# Patient Record
Sex: MALE | Race: WHITE | Employment: OTHER | URBAN - METROPOLITAN AREA
[De-identification: names, ages, dates, MRNs, and addresses within clinical notes are randomized per-mention and may not be internally consistent; named-entity substitution may affect disease eponyms.]

---

## 2017-01-25 RX ORDER — PANTOPRAZOLE SODIUM 40 MG/1
TABLET, DELAYED RELEASE ORAL
Qty: 180 TAB | Refills: 0 | Status: SHIPPED | OUTPATIENT
Start: 2017-01-25 | End: 2017-01-31 | Stop reason: CLARIF

## 2017-01-31 RX ORDER — PANTOPRAZOLE SODIUM 40 MG/1
40 TABLET, DELAYED RELEASE ORAL 2 TIMES DAILY
Qty: 180 TAB | Refills: 1 | Status: SHIPPED | OUTPATIENT
Start: 2017-01-31 | End: 2018-01-01 | Stop reason: ALTCHOICE

## 2017-02-22 RX ORDER — LANCETS
EACH MISCELLANEOUS
Qty: 300 EACH | Refills: 2 | Status: CANCELLED | OUTPATIENT
Start: 2017-02-22

## 2017-02-22 RX ORDER — LANCETS
EACH MISCELLANEOUS
Qty: 300 EACH | Refills: 4 | Status: SHIPPED | OUTPATIENT
Start: 2017-02-22

## 2017-02-22 NOTE — TELEPHONE ENCOUNTER
Orders Placed This Encounter    Lancets misc     Sig: by Does Not Apply route Daily (before breakfast). DX: E11.65     Dispense:  300 Each     Refill:  4    glucose blood VI test strips (FREESTYLE LITE STRIPS) strip     Sig: by Does Not Apply route Daily (before breakfast). DX: E11.65     Dispense:  300 Strip     Refill:  4     The above medication refills were approved via verbal order by Dr. Jacy Olivares III.

## 2017-03-03 RX ORDER — FUROSEMIDE 80 MG/1
TABLET ORAL
Qty: 90 TAB | Refills: 0 | Status: SHIPPED | OUTPATIENT
Start: 2017-03-03 | End: 2017-06-06 | Stop reason: SDUPTHER

## 2017-03-03 RX ORDER — ATORVASTATIN CALCIUM 20 MG/1
TABLET, FILM COATED ORAL
Qty: 90 TAB | Refills: 0 | Status: SHIPPED | OUTPATIENT
Start: 2017-03-03 | End: 2017-06-06 | Stop reason: SDUPTHER

## 2017-03-13 ENCOUNTER — OFFICE VISIT (OUTPATIENT)
Dept: INTERNAL MEDICINE CLINIC | Age: 82
End: 2017-03-13

## 2017-03-13 VITALS
SYSTOLIC BLOOD PRESSURE: 130 MMHG | WEIGHT: 245 LBS | HEIGHT: 73 IN | DIASTOLIC BLOOD PRESSURE: 72 MMHG | RESPIRATION RATE: 20 BRPM | HEART RATE: 88 BPM | BODY MASS INDEX: 32.47 KG/M2 | OXYGEN SATURATION: 95 % | TEMPERATURE: 99.1 F

## 2017-03-13 DIAGNOSIS — L98.491 SKIN ULCER, LIMITED TO BREAKDOWN OF SKIN (HCC): Primary | ICD-10-CM

## 2017-03-13 RX ORDER — CEPHALEXIN 250 MG/1
250 CAPSULE ORAL 3 TIMES DAILY
Qty: 30 CAP | Refills: 0 | Status: SHIPPED | OUTPATIENT
Start: 2017-03-13 | End: 2017-07-11 | Stop reason: ALTCHOICE

## 2017-03-13 NOTE — MR AVS SNAPSHOT
Visit Information Date & Time Provider Department Dept. Phone Encounter #  
 3/13/2017  3:30 PM Johan Canchola MD [facility] Internal Medicine [phone] [id number] Your Appointments 3/22/2017  2:00 PM  
ROUTINE CARE with Johan Canchola MD  
[facility] Internal Medicine [address]) Appt Note: 6mo f/u; sw  
 [address] [facility] [address] [facility] [address]  
[address] [address] [place] Upcoming Health Maintenance Date Due DTaP/Tdap/Td series (1 - Tdap) 4/20/1952 MEDICARE YEARLY EXAM 9/6/2013 FOOT EXAM Q1 4/6/2016 INFLUENZA AGE 9 TO ADULT 8/1/2016 HEMOGLOBIN A1C Q6M 3/29/2017 LIPID PANEL Q1 9/29/2017 MICROALBUMIN Q1 11/2/2017 EYE EXAM RETINAL OR DILATED Q1 11/10/2017 Pneumococcal 65+ Low/Medium Risk (2 of 2 - PPSV23) 4/25/2018 GLAUCOMA SCREENING Q2Y 11/10/2018 Allergies as of 3/13/2017  Review Complete On: 3/13/2017 By: Christopher Escamilla LPN Severity Noted Reaction Type Reactions Erythromycin High 09/05/2012    Swelling Goiter swells Lisinopril  05/07/2014   Systemic Other (comments) [place] Losartan  10/20/2014    Shortness of Breath Current Immunizations  Reviewed on 10/20/2014 Name Date Influenza Vaccine 12/1/2013 Influenza Vaccine Split 12/19/2012 Pneumococcal Vaccine (Unspecified Type) 4/25/2013 Not reviewed this visit Vitals BP Pulse Temp Resp Height(growth percentile) Weight(growth percentile) 130/72 (BP 1 Location: Right arm, BP Patient Position: Sitting) 88 99.1 °F (37.3 °C) (Oral) 20 6' 1\" (1.854 m) 245 lb (111.1 kg) SpO2 BMI Smoking Status 95% 32.32 kg/m2 Former Smoker BMI and BSA Data Body Mass Index Body Surface Area  
 32.32 kg/m 2 2.39 m 2 Preferred Pharmacy Pharmacy Name Phone  [address] [facility] [phone] Your Updated Medication List  
  
   
This list is accurate as of: 3/13/17  3:59 PM.  Always use your most recent med list.  
  
  
  
  
 albuterol 90 mcg/actuation inhaler Commonly known as:  PROVENTIL HFA, VENTOLIN HFA, PROAIR HFA Take 1 Puff by inhalation every four (4) hours as needed for Wheezing or Shortness of Breath. aspirin delayed-release 81 mg tablet Take 81 mg by mouth daily. atorvastatin 20 mg tablet Commonly known as:  LIPITOR  
TAKE 1 TABLET BY MOUTH EVERY DAY Blood-Glucose Meter monitoring kit Commonly known as:  FREESTYLE LITE METER As directed 250.00 * carvedilol 6.25 mg tablet Commonly known as:  Travis Peat Take 1 Tab by mouth two (2) times daily (with meals). * carvedilol 6.25 mg tablet Commonly known as:  COREG  
TAKE 2 TABLETS BY MOUTH TWICE A DAY CENTRUM SILVER Tab tablet Generic drug:  multivitamins-minerals-lutein Take 1 Tab by mouth daily. cephALEXin 250 mg capsule Commonly known as:  Rahul Began Take 1 Cap by mouth three (3) times daily. docusate sodium 50 mg capsule Commonly known as:  Mat [place] Take 50 mg by mouth as needed. furosemide 80 mg tablet Commonly known as:  LASIX TAKE 1 TABLET BY MOUTH EVERY DAY  
  
 glucose blood VI test strips strip Commonly known as:  FREESTYLE LITE STRIPS  
by Does Not Apply route Daily (before breakfast). DX: E11.65  
  
 ICAPS AREDS PO Take 1 Cap by mouth two (2) times a day. Iron 325 mg (65 mg iron) tablet Generic drug:  ferrous sulfate Take 325 mg by mouth daily. Lancets Misc  
by Does Not Apply route Daily (before breakfast). DX: E11.65 OXYGEN-AIR DELIVERY SYSTEMS  
3 L by Does Not Apply route. pantoprazole 40 mg tablet Commonly known as:  PROTONIX Take 1 Tab by mouth two (2) times a day. senna-docusate 8.6-50 mg per tablet Commonly known as:  Rosetta Guzman Take 3 Tabs by mouth as needed. SITagliptin 50 mg tablet Commonly known as:  Ty Muscat Take 1 Tab by mouth daily. TYLENOL 325 mg tablet Generic drug:  acetaminophen Take 650 mg by mouth as needed. umeclidinium-vilanterol 62.5-25 mcg/actuation inhaler Commonly known as:  Tanya Pu Take 1 Puff by inhalation daily. ZyrTEC 10 mg tablet Generic drug:  cetirizine Take 10 mg by mouth nightly. * Notice: This list has 2 medication(s) that are the same as other medications prescribed for you. Read the directions carefully, and ask your doctor or other care provider to review them with you. Prescriptions Sent to Pharmacy Refills  
 cephALEXin (KEFLEX) 250 mg capsule 0 Sig: Take 1 Cap by mouth three (3) times daily. Class: Normal  
 Pharmacy: [address], [address]  #: [phone] Route: Oral  
  
Patient Instructions Use Dial soap and water to wash the ulcer daily and then small amount of neosporin daily. Introducing [facility]! Dear Che Blackmon: 
Thank you for requesting a IntelligentMDx account. Our records indicate that you already have an active IntelligentMDx account. You can access your account anytime at [email or web address] Did you know that you can access your hospital and ER discharge instructions at any time in IntelligentMDx? You can also review all of your test results from your hospital stay or ER visit. Additional Information If you have questions, please visit the Frequently Asked Questions section of the IntelligentMDx website at [email or web address] Remember, IntelligentMDx is NOT to be used for urgent needs. For medical emergencies, dial 911. Now available from your iPhone and Android! Please provide this summary of care documentation to your next provider. Your primary care clinician is listed as Sara [phone]  If you have any questions after today's visit, please call [phone].

## 2017-03-13 NOTE — PROGRESS NOTES
HISTORY OF PRESENT ILLNESS  Christoph Padilla is a 80 y.o. male. HPI  Presents for a lesion on the right nipple for the last week or so. Some bloody drainage. No fever or chills. No injury. No other skin issues. Also feeling well otherwise. Past Medical History:   Diagnosis Date    Arrhythmia     pacemaker    CAD (coronary artery disease)     Diabetes ([address]     Heart disease     Hypertension     TRACI on CPAP     AHI: 24 per hour    Unspecified sleep apnea     CPAP    Vertigo      Past Surgical History:   Procedure Laterality Date    CARDIAC SURG PROCEDURE UNLIST  2007    valve replacement, one bypass, AAA repair    CARDIAC SURG PROCEDURE UNLIST  2014    HX HEENT      uvulectomy    HX HEENT      teeth    HX HEENT      cateract    HX ORTHOPAEDIC      rotator cuff - rt    HX PACEMAKER       Current Outpatient Prescriptions on File Prior to Visit   Medication Sig Dispense Refill    furosemide (LASIX) 80 mg tablet TAKE 1 TABLET BY MOUTH EVERY DAY 90 Tab 0    atorvastatin (LIPITOR) 20 mg tablet TAKE 1 TABLET BY MOUTH EVERY DAY 90 Tab 0    glucose blood VI test strips (FREESTYLE LITE STRIPS) strip by Does Not Apply route Daily (before breakfast). DX: E11.65 300 Strip 4    pantoprazole (PROTONIX) 40 mg tablet Take 1 Tab by mouth two (2) times a day. 180 Tab 1    carvedilol (COREG) 6.25 mg tablet TAKE 2 TABLETS BY MOUTH TWICE A  Tab 1    carvedilol (COREG) 6.25 mg tablet Take 1 Tab by mouth two (2) times daily (with meals). 180 Tab 1    umeclidinium-vilanterol (ANORO ELLIPTA) 62.5-25 mcg/actuation inhaler Take 1 Puff by inhalation daily. 1 Inhaler 6    OXYGEN-AIR DELIVERY SYSTEMS 3 L by Does Not Apply route.  sitaGLIPtin (JANUVIA) 50 mg tablet Take 1 Tab by mouth daily. 90 Tab 1    albuterol (PROVENTIL HFA, VENTOLIN HFA, PROAIR HFA) 90 mcg/actuation inhaler Take 1 Puff by inhalation every four (4) hours as needed for Wheezing or Shortness of Breath.  1 Inhaler 1    ferrous sulfate (IRON) 325 mg (65 mg iron) tablet Take 325 mg by mouth daily.  docusate sodium (COLACE) 50 mg capsule Take 50 mg by mouth as needed.  senna-docusate (PERICOLACE) 8.6-50 mg per tablet Take 3 Tabs by mouth as needed.  cetirizine (ZYRTEC) 10 mg tablet Take 10 mg by mouth nightly.  acetaminophen (TYLENOL) 325 mg tablet Take 650 mg by mouth as needed.  aspirin delayed-release 81 mg tablet Take 81 mg by mouth daily.  VIT A/VIT C/VIT E/ZINC/COPPER (ICAPS AREDS PO) Take 1 Cap by mouth two (2) times a day.  Blood-Glucose Meter (FREESTYLE LITE METER) monitoring kit As directed  250.00 1 Kit 0    multivitamins-minerals-lutein (CENTRUM SILVER) Tab Take 1 Tab by mouth daily.  Lancets misc by Does Not Apply route Daily (before breakfast). DX: E11.65 300 Each 4     No current facility-administered medications on file prior to visit. ROS  Per HPI  Physical Exam   Physical Examination: General appearance - alert, well appearing, and in no distress  Chest - clear to auscultation, no wheezes, rales or rhonchi, symmetric air entry  Heart - normal rate and regular rhythm  Right nipple with small ulcer below the nipple with a scabbed area. Some mild surrounding erythema. Unable to get the scab to come off. ASSESSMENT and PLAN  Infected skin ulcer on the chest - May all be a seb cyst that opened and drained but also consider infected skin cancer or paget's. Will treat topically and with oral keflex and see him next week for followup and decide about derm MD evaluation if needed. Patient Instructions   Use Dial soap and water to wash the ulcer daily and then small amount of neosporin daily. Orders Placed This Encounter    cephALEXin (KEFLEX) 250 mg capsule     Sig: Take 1 Cap by mouth three (3) times daily. Dispense:  30 Cap     Refill:  0    SITagliptin (JANUVIA) 100 mg tablet     Sig: Take 1 Tab by mouth daily. Take 1/2 tablet (50 mg) daily.      Dispense:  14 Tab     Refill:  0 Order Specific Question:   Expiration Date     Answer:   6/30/2019     Order Specific Question:   Lot#     Answer:   [id number]     Order Specific Question:        Answer:   [employer]     Order Specific Question:   NDC#     Answer:   No NDC on box. Advised him to call back or return to office if symptoms worsen/change/persist.  Discussed expected course/resolution/complications of diagnosis in detail with patient. Medication risks/benefits/costs/interactions/alternatives discussed with patient. He was given an after visit summary which includes diagnoses, current medications, & vitals. He expressed understanding with the diagnosis and plan.

## 2017-03-22 ENCOUNTER — OFFICE VISIT (OUTPATIENT)
Dept: INTERNAL MEDICINE CLINIC | Age: 82
End: 2017-03-22

## 2017-03-22 VITALS
SYSTOLIC BLOOD PRESSURE: 110 MMHG | OXYGEN SATURATION: 95 % | DIASTOLIC BLOOD PRESSURE: 70 MMHG | HEART RATE: 69 BPM | TEMPERATURE: 97.7 F | BODY MASS INDEX: 33.08 KG/M2 | HEIGHT: 73 IN | WEIGHT: 249.6 LBS | RESPIRATION RATE: 19 BRPM

## 2017-03-22 DIAGNOSIS — N17.9 AKI (ACUTE KIDNEY INJURY) (HCC): ICD-10-CM

## 2017-03-22 DIAGNOSIS — G47.33 OBSTRUCTIVE SLEEP APNEA SYNDROME: ICD-10-CM

## 2017-03-22 DIAGNOSIS — D69.6 THROMBOCYTOPENIA, UNSPECIFIED (HCC): ICD-10-CM

## 2017-03-22 DIAGNOSIS — E11.65 POORLY CONTROLLED TYPE 2 DIABETES MELLITUS (HCC): Primary | ICD-10-CM

## 2017-03-22 NOTE — PROGRESS NOTES
HPI:  Jw Lawrence is a 80y.o. year old male who is here for a routine visit:    Her for followup as well as the right breast lesion. Has been doing better with the skin lesion. No drainage. No fever or chills. No low sugars and most are in the 180 or less range. No chest pain. Some BRITTON. Some sore throat for 1 days as well. No change in bowels or bladder. Past Medical History:   Diagnosis Date    Arrhythmia     pacemaker    CAD (coronary artery disease)     Diabetes ([address]     Heart disease     Hypertension     JW on CPAP     AHI: 24 per hour    Unspecified sleep apnea     CPAP    Vertigo        Past Surgical History:   Procedure Laterality Date    CARDIAC SURG PROCEDURE UNLIST  2007    valve replacement, one bypass, AAA repair    CARDIAC SURG PROCEDURE UNLIST  2014    HX HEENT      uvulectomy    HX HEENT      teeth    HX HEENT      cateract    HX ORTHOPAEDIC      rotator cuff - rt    HX PACEMAKER         Prior to Admission medications    Medication Sig Start Date End Date Taking? Authorizing Provider   cephALEXin (KEFLEX) 250 mg capsule Take 1 Cap by mouth three (3) times daily. 3/13/17  Yes Srinivasa Betancourt III, MD   SITagliptin (JANUVIA) 100 mg tablet Take 1 Tab by mouth daily. Take 1/2 tablet (50 mg) daily. 3/13/17  Yes Srinivasa Betancourt III, MD   furosemide (LASIX) 80 mg tablet TAKE 1 TABLET BY MOUTH EVERY DAY 3/3/17  Yes Srinivasa Beatncourt III, MD   atorvastatin (LIPITOR) 20 mg tablet TAKE 1 TABLET BY MOUTH EVERY DAY 3/3/17  Yes Celestina Toscano MD   Lancets misc by Does Not Apply route Daily (before breakfast). DX: E11.65 2/22/17  Yes Srinivasa Betancourt III, MD   glucose blood VI test strips (FREESTYLE LITE STRIPS) strip by Does Not Apply route Daily (before breakfast). DX: E11.65 2/22/17  Yes Srinivasa Betancourt III, MD   pantoprazole (PROTONIX) 40 mg tablet Take 1 Tab by mouth two (2) times a day.  1/31/17  Yes Srinivasa Betancourt III, MD   carvedilol (COREG) 6.25 mg tablet TAKE 2 TABLETS BY MOUTH TWICE A DAY 12/13/16  Yes Trisha Lindsey III, MD   carvedilol (COREG) 6.25 mg tablet Take 1 Tab by mouth two (2) times daily (with meals). 10/6/16  Yes Jane Villalobos MD   umeclidinium-vilanterol [facility] ELLIPTA) 62.5-25 mcg/actuation inhaler Take 1 Puff by inhalation daily. 9/30/16  Yes Jane Villalobos MD   OXYGEN-AIR DELIVERY SYSTEMS 3 L by Does Not Apply route. Yes Historical Provider   sitaGLIPtin (JANUVIA) 50 mg tablet Take 1 Tab by mouth daily. 9/22/16  Yes Trisha Lindsey III, MD   albuterol (PROVENTIL HFA, VENTOLIN HFA, PROAIR HFA) 90 mcg/actuation inhaler Take 1 Puff by inhalation every four (4) hours as needed for Wheezing or Shortness of Breath. 2/27/15  Yes Deana Delatorre MD   ferrous sulfate (IRON) 325 mg (65 mg iron) tablet Take 325 mg by mouth daily. 8/24/14  Yes Historical Provider   docusate sodium (COLACE) 50 mg capsule Take 50 mg by mouth as needed. Yes Historical Provider   senna-docusate (PERICOLACE) 8.6-50 mg per tablet Take 3 Tabs by mouth as needed. Yes Historical Provider   cetirizine (ZYRTEC) 10 mg tablet Take 10 mg by mouth nightly. Yes Historical Provider   acetaminophen (TYLENOL) 325 mg tablet Take 650 mg by mouth as needed. Yes Historical Provider   aspirin delayed-release 81 mg tablet Take 81 mg by mouth daily. Yes Historical Provider   VIT A/VIT C/VIT E/ZINC/COPPER (ICAPS AREDS PO) Take 1 Cap by mouth two (2) times a day. Yes Historical Provider   Blood-Glucose Meter (FREESTYLE LITE METER) monitoring kit As directed  250.00 2/25/13  Yes Jane Villalobos MD   multivitamins-minerals-lutein (CENTRUM SILVER) Tab Take 1 Tab by mouth daily. Yes Historical Provider       Social History     Social History    Marital status:      Spouse name: N/A    Number of children: N/A    Years of education: N/A     Occupational History    Not on file.      Social History Main Topics    Smoking status: Former Smoker     Packs/day: 1.50     Years: 17.00     Quit date: 1/1/1969    Smokeless tobacco: Never Used    Alcohol use No    Drug use: No    Sexual activity: Not Currently     Other Topics Concern    Not on file     Social History Narrative          ROS  Per HPI    Visit Vitals    /70 (BP 1 Location: Right arm, BP Patient Position: Sitting)    Pulse 69    Temp 97.7 °F (36.5 °C) (Oral)    Resp 19    Ht 6' 1\" (1.854 m)    Wt 249 lb 9.6 oz (113.2 kg)    SpO2 95%    BMI 32.93 kg/m2         Physical Exam   Physical Examination: General appearance - alert, well appearing, and in no distress  Mouth - mucous membranes moist, pharynx normal without lesions  Neck - supple, no significant adenopathy  Lymphatics - no palpable lymphadenopathy, no hepatosplenomegaly  Chest - clear to auscultation, no wheezes, rales or rhonchi, symmetric air entry  Heart - normal rate and regular rhythm  Abdomen - soft, nontender, nondistended, no masses or organomegaly  Extremities - peripheral pulses normal, no pedal edema, no clubbing or cyanosis  Right nipple with small area of scabbed area with no surrounding erythema. Assessment/Plan:  Bharath Don was seen today for follow-up. Diagnoses and all orders for this visit:    Poorly controlled type 2 diabetes mellitus ([address] - check labs and adjust meds. -     CBC WITH AUTOMATED DIFF  -     METABOLIC PANEL, COMPREHENSIVE  -     LIPID PANEL  -     TSH RFX ON ABNORMAL TO FREE T4  -     UA/M W/RFLX CULTURE, ROUTINE  -     HEMOGLOBIN A1C WITH EAG    Thrombocytopenia, unspecified ([address] - check labs. MAYKEL (acute kidney injury) ([facility] - stable    Obstructive sleep apnea syndrome - stable    Skin infection - stable    Follow-up Disposition:  Return for Wellness Visit. Advised him to call back or return to office if symptoms worsen/change/persist.  Discussed expected course/resolution/complications of diagnosis in detail with patient. Medication risks/benefits/costs/interactions/alternatives discussed with patient.   He was given an after visit summary which includes diagnoses, current medications, & vitals. He expressed understanding with the diagnosis and plan.

## 2017-03-22 NOTE — MR AVS SNAPSHOT
Visit Information Date & Time Provider Department Dept. Phone Encounter #  
 3/22/2017  2:00 PM Haseeb Mueller MD [facility] Internal Medicine [phone] [id number] Follow-up Instructions Return for Wellness Visit. Upcoming Health Maintenance Date Due DTaP/Tdap/Td series (1 - Tdap) 4/20/1952 MEDICARE YEARLY EXAM 9/6/2013 FOOT EXAM Q1 4/6/2016 INFLUENZA AGE 9 TO ADULT 8/1/2016 HEMOGLOBIN A1C Q6M 3/29/2017 LIPID PANEL Q1 9/29/2017 MICROALBUMIN Q1 11/2/2017 EYE EXAM RETINAL OR DILATED Q1 11/10/2017 Pneumococcal 65+ Low/Medium Risk (2 of 2 - PPSV23) 4/25/2018 GLAUCOMA SCREENING Q2Y 11/10/2018 Allergies as of 3/22/2017  Review Complete On: 3/22/2017 By: Jorge Luis Rubio Severity Noted Reaction Type Reactions Erythromycin High 09/05/2012    Swelling Goiter swells Lisinopril  05/07/2014   Systemic Other (comments) [place] Losartan  10/20/2014    Shortness of Breath Current Immunizations  Reviewed on 10/20/2014 Name Date Influenza Vaccine 12/1/2013 Influenza Vaccine Split 12/19/2012 Pneumococcal Vaccine (Unspecified Type) 4/25/2013 Not reviewed this visit You Were Diagnosed With   
  
 Codes Comments Poorly controlled type 2 diabetes mellitus ([facility]    -  Primary ICD-10-CM: E11.65 ICD-9-CM: 250.00 Thrombocytopenia, unspecified ([facility]     ICD-10-CM: D69.6 ICD-9-CM: 287.5 MAYKEL (acute kidney injury) ([facility]     ICD-10-CM: N17.9 ICD-9-CM: 584.9 Obstructive sleep apnea syndrome     ICD-10-CM: G47.33 
ICD-9-CM: 327.23 Vitals BP Pulse Temp Resp Height(growth percentile) Weight(growth percentile) 110/70 (BP 1 Location: Right arm, BP Patient Position: Sitting) 69 97.7 °F (36.5 °C) (Oral) 19 6' 1\" (1.854 m) 249 lb 9.6 oz (113.2 kg) SpO2 BMI Smoking Status 95% 32.93 kg/m2 Former Smoker BMI and BSA Data  Body Mass Index Body Surface Area  
 32.93 kg/m 2 2.41 m 2  
  
  
 Preferred Pharmacy Pharmacy Name Phone [id number] Bronson [facility], [address] [facility] [phone] Your Updated Medication List  
  
   
This list is accurate as of: 3/22/17  2:33 PM.  Always use your most recent med list.  
  
  
  
  
 albuterol 90 mcg/actuation inhaler Commonly known as:  PROVENTIL HFA, VENTOLIN HFA, PROAIR HFA Take 1 Puff by inhalation every four (4) hours as needed for Wheezing or Shortness of Breath. aspirin delayed-release 81 mg tablet Take 81 mg by mouth daily. atorvastatin 20 mg tablet Commonly known as:  LIPITOR  
TAKE 1 TABLET BY MOUTH EVERY DAY Blood-Glucose Meter monitoring kit Commonly known as:  FREESTYLE LITE METER As directed 250.00 * carvedilol 6.25 mg tablet Commonly known as:  Cleatis Tito Take 1 Tab by mouth two (2) times daily (with meals). * carvedilol 6.25 mg tablet Commonly known as:  COREG  
TAKE 2 TABLETS BY MOUTH TWICE A DAY CENTRUM SILVER Tab tablet Generic drug:  multivitamins-minerals-lutein Take 1 Tab by mouth daily. cephALEXin 250 mg capsule Commonly known as:  Myriam Hides Take 1 Cap by mouth three (3) times daily. docusate sodium 50 mg capsule Commonly known as:  Annice Deep Take 50 mg by mouth as needed. furosemide 80 mg tablet Commonly known as:  LASIX TAKE 1 TABLET BY MOUTH EVERY DAY  
  
 glucose blood VI test strips strip Commonly known as:  FREESTYLE LITE STRIPS  
by Does Not Apply route Daily (before breakfast). DX: E11.65  
  
 ICAPS AREDS PO Take 1 Cap by mouth two (2) times a day. Iron 325 mg (65 mg iron) tablet Generic drug:  ferrous sulfate Take 325 mg by mouth daily. Lancets Misc  
by Does Not Apply route Daily (before breakfast). DX: E11.65 OXYGEN-AIR DELIVERY SYSTEMS  
3 L by Does Not Apply route. pantoprazole 40 mg tablet Commonly known as:  PROTONIX Take 1 Tab by mouth two (2) times a day. senna-docusate 8.6-50 mg per tablet Commonly known as:  Kristi Morgan Take 3 Tabs by mouth as needed. * SITagliptin 50 mg tablet Commonly known as:  Aletha Robb Take 1 Tab by mouth daily. * SITagliptin 100 mg tablet Commonly known as:  Artia [place] Take 1 Tab by mouth daily. Take 1/2 tablet (50 mg) daily. TYLENOL 325 mg tablet Generic drug:  acetaminophen Take 650 mg by mouth as needed. umeclidinium-vilanterol 62.5-25 mcg/actuation inhaler Commonly known as:  Velta [place] Take 1 Puff by inhalation daily. ZyrTEC 10 mg tablet Generic drug:  cetirizine Take 10 mg by mouth nightly. * Notice: This list has 4 medication(s) that are the same as other medications prescribed for you. Read the directions carefully, and ask your doctor or other care provider to review them with you. We Performed the Following CBC WITH AUTOMATED DIFF [77059 CPT(R)] HEMOGLOBIN A1C WITH EAG [78806 CPT(R)] LIPID PANEL [76548 CPT(R)] METABOLIC PANEL, COMPREHENSIVE [87697 CPT(R)] TSH RFX ON ABNORMAL TO FREE T4 [[id number] Custom] UA/M W/RFLX CULTURE, ROUTINE [[id number] Custom] Follow-up Instructions Return for Wellness Visit. Introducing [facility]! Dear Ethan Virgen: 
Thank you for requesting a SeeMore Interactive account. Our records indicate that you already have an active SeeMore Interactive account. You can access your account anytime at [email or web address] Did you know that you can access your hospital and ER discharge instructions at any time in SeeMore Interactive? You can also review all of your test results from your hospital stay or ER visit. Additional Information If you have questions, please visit the Frequently Asked Questions section of the SeeMore Interactive website at [email or web address] Remember, SeeMore Interactive is NOT to be used for urgent needs. For medical emergencies, dial 911. Now available from your iPhone and Android! Please provide this summary of care documentation to your next provider. Your primary care clinician is listed as Bris[place] [phone] If you have any questions after today's visit, please call [phone].

## 2017-03-26 LAB — CREATININE, EXTERNAL: 1.61

## 2017-03-27 ENCOUNTER — HOSPITAL ENCOUNTER (OUTPATIENT)
Dept: LAB | Age: 82
Discharge: HOME | End: 2017-03-27
Payer: MEDICARE

## 2017-03-27 ENCOUNTER — PATIENT OUTREACH (OUTPATIENT)
Dept: INTERNAL MEDICINE CLINIC | Age: 82
End: 2017-03-27

## 2017-03-27 PROCEDURE — 80061 LIPID PANEL: CPT

## 2017-03-27 PROCEDURE — 36415 COLL VENOUS BLD VENIPUNCTURE: CPT

## 2017-03-27 PROCEDURE — 80053 COMPREHEN METABOLIC PANEL: CPT

## 2017-03-27 PROCEDURE — 85025 COMPLETE CBC W/AUTO DIFF WBC: CPT

## 2017-03-27 PROCEDURE — 83036 HEMOGLOBIN GLYCOSYLATED A1C: CPT

## 2017-03-27 PROCEDURE — 81001 URINALYSIS AUTO W/SCOPE: CPT

## 2017-03-27 PROCEDURE — 84443 ASSAY THYROID STIM HORMONE: CPT

## 2017-03-27 NOTE — PROGRESS NOTES
[address] [facility] Follow Up for [address] ED Visit on 3/26/17 for SOB, productive cough    Pt presented to ED c/o SOB,cough with yellow sputum, sore throat and increased o2 need. Labs in ED:  Cr 1.61, ALb 3.0, 127 Plt    CXR Impression:  Persistent cardiomegaly. No pneumothorax, No obvious acute CHF, no pneumonia    He was given 2 albuterol neb treatments in ED. Pt diagnosed with bronchitis. He was sent home with a script for Amoxicillin. Called pt and spoke with Catherene Najjar, daughter. Verified him with 2 identifiers. Says he is better. He is taking Amoxicillin as prescribed and is taking Mucinex. Is drinking Gatorade PRN. Catherene Najjar is reminding him to continue to use Albuterol PRN. His wheezing and coughing is much better. Pt is out getting breakfast biscuit. Catherene Najjar recommended that he skip cardiac rehab since he continues to be a little BRITTON. Pt is stubborn about wearing his o2 continuously. Instructed her to call if Romy isn't improving.

## 2017-03-28 LAB
ALBUMIN SERPL-MCNC: 3.8 G/DL (ref 3.5–4.7)
ALBUMIN/GLOB SERPL: 1.2 {RATIO} (ref 1.2–2.2)
ALP SERPL-CCNC: 68 IU/L (ref 39–117)
ALT SERPL-CCNC: 7 IU/L (ref 0–44)
APPEARANCE UR: CLEAR
AST SERPL-CCNC: 18 IU/L (ref 0–40)
BACTERIA #/AREA URNS HPF: ABNORMAL /[HPF]
BASOPHILS # BLD AUTO: 0 X10E3/UL (ref 0–0.2)
BASOPHILS NFR BLD AUTO: 1 %
BILIRUB SERPL-MCNC: 0.4 MG/DL (ref 0–1.2)
BILIRUB UR QL STRIP: NEGATIVE
BUN SERPL-MCNC: 31 MG/DL (ref 8–27)
BUN/CREAT SERPL: 19 (ref 10–22)
CALCIUM SERPL-MCNC: 8.8 MG/DL (ref 8.6–10.2)
CASTS URNS MICRO: ABNORMAL
CASTS URNS QL MICRO: PRESENT /LPF
CHLORIDE SERPL-SCNC: 95 MMOL/L (ref 96–106)
CHOLEST SERPL-MCNC: 162 MG/DL (ref 100–199)
CO2 SERPL-SCNC: 30 MMOL/L (ref 18–29)
COLOR UR: YELLOW
CREAT SERPL-MCNC: 1.65 MG/DL (ref 0.76–1.27)
EOSINOPHIL # BLD AUTO: 0.1 X10E3/UL (ref 0–0.4)
EOSINOPHIL NFR BLD AUTO: 1 %
EPI CELLS #/AREA URNS HPF: ABNORMAL /HPF
ERYTHROCYTE [DISTWIDTH] IN BLOOD BY AUTOMATED COUNT: 14.2 % (ref 12.3–15.4)
EST. AVERAGE GLUCOSE BLD GHB EST-MCNC: 169 MG/DL
GLOBULIN SER CALC-MCNC: 3.2 G/DL (ref 1.5–4.5)
GLUCOSE SERPL-MCNC: 162 MG/DL (ref 65–99)
GLUCOSE UR QL: NEGATIVE
HBA1C MFR BLD: 7.5 % (ref 4.8–5.6)
HCT VFR BLD AUTO: 39.8 % (ref 37.5–51)
HDLC SERPL-MCNC: 36 MG/DL
HGB BLD-MCNC: 13.2 G/DL (ref 12.6–17.7)
HGB UR QL STRIP: NEGATIVE
IMM GRANULOCYTES # BLD: 0 X10E3/UL (ref 0–0.1)
IMM GRANULOCYTES NFR BLD: 1 %
KETONES UR QL STRIP: NEGATIVE
LDLC SERPL CALC-MCNC: 89 MG/DL (ref 0–99)
LEUKOCYTE ESTERASE UR QL STRIP: NEGATIVE
LYMPHOCYTES # BLD AUTO: 0.9 X10E3/UL (ref 0.7–3.1)
LYMPHOCYTES NFR BLD AUTO: 20 %
MCH RBC QN AUTO: 31.3 PG (ref 26.6–33)
MCHC RBC AUTO-ENTMCNC: 33.2 G/DL (ref 31.5–35.7)
MCV RBC AUTO: 94 FL (ref 79–97)
MICRO URNS: ABNORMAL
MONOCYTES # BLD AUTO: 0.6 X10E3/UL (ref 0.1–0.9)
MONOCYTES NFR BLD AUTO: 14 %
MUCOUS THREADS URNS QL MICRO: PRESENT
NEUTROPHILS # BLD AUTO: 2.8 X10E3/UL (ref 1.4–7)
NEUTROPHILS NFR BLD AUTO: 63 %
NITRITE UR QL STRIP: NEGATIVE
PH UR STRIP: 6 [PH] (ref 5–7.5)
PLATELET # BLD AUTO: 160 X10E3/UL (ref 150–379)
POTASSIUM SERPL-SCNC: 4.4 MMOL/L (ref 3.5–5.2)
PROT SERPL-MCNC: 7 G/DL (ref 6–8.5)
PROT UR QL STRIP: ABNORMAL
RBC # BLD AUTO: 4.22 X10E6/UL (ref 4.14–5.8)
RBC #/AREA URNS HPF: ABNORMAL /HPF
SODIUM SERPL-SCNC: 143 MMOL/L (ref 134–144)
SP GR UR: 1.02 (ref 1–1.03)
TRIGL SERPL-MCNC: 187 MG/DL (ref 0–149)
TSH SERPL DL<=0.005 MIU/L-ACNC: 1.18 UIU/ML (ref 0.45–4.5)
URINALYSIS REFLEX, 377202: ABNORMAL
UROBILINOGEN UR STRIP-MCNC: 0.2 MG/DL (ref 0.2–1)
VLDLC SERPL CALC-MCNC: 37 MG/DL (ref 5–40)
WBC # BLD AUTO: 4.3 X10E3/UL (ref 3.4–10.8)
WBC #/AREA URNS HPF: ABNORMAL /HPF

## 2017-04-10 ENCOUNTER — PATIENT OUTREACH (OUTPATIENT)
Dept: INTERNAL MEDICINE CLINIC | Age: 82
End: 2017-04-10

## 2017-04-10 DIAGNOSIS — E11.65 POORLY CONTROLLED TYPE 2 DIABETES MELLITUS (HCC): ICD-10-CM

## 2017-04-10 RX ORDER — SITAGLIPTIN 50 MG/1
TABLET, FILM COATED ORAL
Qty: 90 TAB | Refills: 0 | Status: SHIPPED | OUTPATIENT
Start: 2017-04-10 | End: 2017-06-02 | Stop reason: SDUPTHER

## 2017-04-10 NOTE — PROGRESS NOTES
JAMAL     Called pt and spoke with Randa Mann, daughter. Says her dad feels so much better, and sounds so much better. He gets pooped easily. She feels that he isn't using his o2 as much as he should. Still doesn't have much of an appetite. He is forcing himself to eat. Food just doesn't appeal to him. Told her that if it looks like he is losing weight, then to please call for an appt.

## 2017-04-23 RX ORDER — PANTOPRAZOLE SODIUM 40 MG/1
TABLET, DELAYED RELEASE ORAL
Qty: 180 TAB | Refills: 0 | Status: SHIPPED | OUTPATIENT
Start: 2017-04-23 | End: 2017-07-11 | Stop reason: SDUPTHER

## 2017-06-02 DIAGNOSIS — E11.65 POORLY CONTROLLED TYPE 2 DIABETES MELLITUS (HCC): ICD-10-CM

## 2017-06-06 RX ORDER — ATORVASTATIN CALCIUM 20 MG/1
TABLET, FILM COATED ORAL
Qty: 90 TAB | Refills: 0 | Status: SHIPPED | OUTPATIENT
Start: 2017-06-06 | End: 2017-09-02 | Stop reason: SDUPTHER

## 2017-06-06 RX ORDER — FUROSEMIDE 80 MG/1
TABLET ORAL
Qty: 90 TAB | Refills: 0 | Status: SHIPPED | OUTPATIENT
Start: 2017-06-06 | End: 2017-09-21 | Stop reason: SDUPTHER

## 2017-07-03 RX ORDER — CARVEDILOL 6.25 MG/1
TABLET ORAL
Qty: 360 TAB | Refills: 1 | Status: SHIPPED | OUTPATIENT
Start: 2017-07-03 | End: 2017-07-05 | Stop reason: SDUPTHER

## 2017-07-03 NOTE — TELEPHONE ENCOUNTER
Orders Placed This Encounter    carvedilol (COREG) 6.25 mg tablet     Sig: TAKE 2 TABLETS BY MOUTH TWICE DAILY     Dispense:  360 Tab     Refill:  1     **Patient requests 90 days supply**     The above medication refills were approved via verbal order by Dr. Mendoza Sahu III.

## 2017-07-05 ENCOUNTER — PATIENT OUTREACH (OUTPATIENT)
Dept: INTERNAL MEDICINE CLINIC | Age: 82
End: 2017-07-05

## 2017-07-05 LAB — CREATININE, EXTERNAL: 1.75

## 2017-07-05 RX ORDER — CARVEDILOL 6.25 MG/1
6.25 TABLET ORAL 2 TIMES DAILY WITH MEALS
Qty: 180 TAB | Refills: 1 | Status: SHIPPED | OUTPATIENT
Start: 2017-07-05 | End: 2018-01-01 | Stop reason: SDUPTHER

## 2017-07-05 NOTE — PROGRESS NOTES
NNTOCIP- MSSP    Referral from inpatient admission at [facility]. Pt currently admitted to [facility] on 7/4/17 for acute diverticulitis, elevated renal fxn, SOB. Will continue to follow for discharge planning.

## 2017-07-05 NOTE — TELEPHONE ENCOUNTER
Orders Placed This Encounter    carvedilol (COREG) 6.25 mg tablet     Sig: Take 1 Tab by mouth two (2) times daily (with meals). CORRECTED RX     Dispense:  180 Tab     Refill:  1     The above medication refills were approved via verbal order by Dr. Ryan Storm III.

## 2017-07-11 ENCOUNTER — PATIENT OUTREACH (OUTPATIENT)
Dept: INTERNAL MEDICINE CLINIC | Age: 82
End: 2017-07-11

## 2017-07-11 RX ORDER — IPRATROPIUM BROMIDE AND ALBUTEROL SULFATE 2.5; .5 MG/3ML; MG/3ML
3 SOLUTION RESPIRATORY (INHALATION)
COMMUNITY

## 2017-07-11 NOTE — PROGRESS NOTES
[facility] Follow Up for [facility] Admission from 7/5/17 - 7/11/17 for COPD, diverticulitis. RRAT score:  Medium    Advance Medical Directive on file in EMR? no this was discussed with Lieutenant Rojas. Says he was asked in hospital what his wishes are. Told nurse that it was fine to \"put a tube down his throat\" and to be resuscitated. When Lieutenant Rojas talked to him, it sounds like he doesn't want any of that. He said he would decide \"when the time comes. \"  Yesterday he told her that he was ready to give up. His DNR status needs to be addressed. Medical History:     Past Medical History:   Diagnosis Date    Arrhythmia     pacemaker    CAD (coronary artery disease)     Diabetes ([address]     Heart disease     Hypertension     TRACI on CPAP     AHI: 24 per hour    Unspecified sleep apnea     CPAP    Vertigo      This represents Transitions of Care b/c NN spoke with patient and/or caregiver within 1 business days of discharge. Pt's TCM follow up appt is scheduled with Dr. Omar Crawford on 7/17/17, which is within 7 days of discharge. Cynthia Rivera, daughter on 7/11/17 and verified him with 2 identifiers. Pt was currently napping. Wears CPAP when sleeps. Says he continues to have abd pain. Hasn't had a productive BM. She gave him some Miralax when he got home from the hospital.  Will continue to monitor and perhaps give him another dose later today. Reviewed his meds. Says the IP CM was supposed to be arranging the nebulizer. Will call Fouzia Mccabe again today to see if he has received it. If not, NN will reach out to [facility], who supplies his o2. Pt is weak. No HH was ordered for pt. Discussed with Lieutenant Rojas. She didn't seem to think he needed it. Will reassess again today when NN calls to see how his night went and has had a chance to better assess his mobility. Lieutenant Rojas will be calling Dr Delarosa's office to schedule his f/u appt.      Fall Risk Assessment:    Fall Risk Assessment, last 12 mths 3/22/2017   Able to walk? Yes   Fall in past 12 months? No   Fall with injury? -     Course of current Hospitalization (referenced by Peter Worthington MD note dated 7/11/17): Pt presented to ED with inc SOB and constipation x 2 days. Pt has been taking miralax, prune juice and MOM which allowed him to have a small BM. Since then pt has had inc difficulty having a BM. Complains of abd distention along with generalized pain. Pt also with inc SOB. He is on 3L o2 continuously. 1. Acute Diverticulitis - Improved on Levaquin and Flagyl. Now discontinued  2. Constipation - on stool softeners  3. 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 CKD - stg III  5. Sleep Apnea - BiPAP qHS  6. DM - Linaliptin  7. UTI - on Levaquin - UC no growth  8. Hyperlipids - on statins  9. SOB - BiPAP intermittently, COPD  10. 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 Pt d/c home with script for nebulizer     CT Abd Pelvis IMPRESSION:  Correlate for mild diverticulitis without evidence of perforation or abscess. Stable appearance of infrarenal AAA, chronically obstructed left pelvic kidney, right renal cyst.      CXR IMPRESSION:  No acute infiltrates. No edema or effusion or pneumothorax. Pt instructed to f/u with Dr Lilly Ornelas and Dr Ashia Raymundo in 1-2 weeks    Lab/Diagnostics at time of Discharge:   Na 139  K 3.8  Cr 1.62  GFR 43  WBC 4.98  Hgb 12. 0      New medication:  Duoneb 3 ml neb every 4 hrs prn for wheezing and SOB    Prescription Medication total: >10 (pharmacy consult for polypharm needed?) no     Barriers to care? Transportation, o2     Support System consists of: daughter    [address], if so what agency? no    Adherence to previous treatment and likelihood for f/u: fair    Past Hospitalizations and/or ED visits last 12 months?  1

## 2017-07-12 NOTE — PROGRESS NOTES
Called [employer] deal Jaimie De La Rosa has purchased [facility]. She received a call last night and the nebulizer is supposed to be delivered today. She will go out today to  the solution. Mariaa Gomez measured his pulse ox last night on o2 and it was btwn 86-90%. They were able to get an appt with CHULA Nelson today at [address] [place] [address],Ground Floor still feels that he doesn't need PT. He has been up walking to bathroom. Says he still hasn't had a BM after giving him another dose of Miralax with dinner last night. Apparently he has also taken 3 Senna this morning. She feels that she may have to resort to Mag Citrate. NN called Keysha Gamino. Had to LM with Camelai Pageok him of pt's appt time and the need to deliver the nab either before or some point after this time.

## 2017-07-17 ENCOUNTER — OFFICE VISIT (OUTPATIENT)
Dept: INTERNAL MEDICINE CLINIC | Age: 82
End: 2017-07-17

## 2017-07-17 VITALS
HEIGHT: 73 IN | TEMPERATURE: 98.2 F | HEART RATE: 88 BPM | OXYGEN SATURATION: 86 % | BODY MASS INDEX: 31.2 KG/M2 | WEIGHT: 235.4 LBS | DIASTOLIC BLOOD PRESSURE: 60 MMHG | RESPIRATION RATE: 32 BRPM | SYSTOLIC BLOOD PRESSURE: 98 MMHG

## 2017-07-17 DIAGNOSIS — K57.32 DIVERTICULITIS OF LARGE INTESTINE WITHOUT PERFORATION OR ABSCESS WITHOUT BLEEDING: ICD-10-CM

## 2017-07-17 DIAGNOSIS — J42 CHRONIC BRONCHITIS, UNSPECIFIED CHRONIC BRONCHITIS TYPE (HCC): Primary | ICD-10-CM

## 2017-07-17 DIAGNOSIS — G47.33 OBSTRUCTIVE SLEEP APNEA SYNDROME: ICD-10-CM

## 2017-07-17 DIAGNOSIS — N17.9 AKI (ACUTE KIDNEY INJURY) (HCC): ICD-10-CM

## 2017-07-17 RX ORDER — LANOLIN ALCOHOL/MO/W.PET/CERES
1000 CREAM (GRAM) TOPICAL DAILY
COMMUNITY

## 2017-07-17 RX ORDER — FOLIC ACID 1 MG/1
TABLET ORAL DAILY
COMMUNITY

## 2017-07-17 NOTE — MR AVS SNAPSHOT
Visit Information Date & Time Provider Department Dept. Phone Encounter #  
 7/17/2017 11:15 AM Lesley Villa MD [address] [address] Internal Medicine [phone] [id number] Upcoming Health Maintenance Date Due DTaP/Tdap/Td series (1 - Tdap) 4/20/1952 MEDICARE YEARLY EXAM 9/6/2013 FOOT EXAM Q1 4/6/2016 INFLUENZA AGE 9 TO ADULT 8/1/2017 HEMOGLOBIN A1C Q6M 9/27/2017 MICROALBUMIN Q1 11/2/2017 EYE EXAM RETINAL OR DILATED Q1 11/10/2017 LIPID PANEL Q1 3/27/2018 Pneumococcal 65+ Low/Medium Risk (2 of 2 - PPSV23) 4/25/2018 GLAUCOMA SCREENING Q2Y 11/10/2018 Allergies as of 7/17/2017  Review Complete On: 7/17/2017 By: Malina Siegel LPN Severity Noted Reaction Type Reactions Erythromycin High 09/05/2012    Swelling Goiter swells Lisinopril  05/07/2014   Systemic Other (comments) [place] Losartan  10/20/2014    Shortness of Breath Current Immunizations  Reviewed on 10/20/2014 Name Date Influenza Vaccine 12/1/2013 Influenza Vaccine Split 12/19/2012 Pneumococcal Vaccine (Unspecified Type) 4/25/2013 Not reviewed this visit Vitals BP Pulse Temp Resp Height(growth percentile) Weight(growth percentile) 98/60 (BP 1 Location: Left arm, BP Patient Position: Sitting) 88 98.2 °F (36.8 °C) (Oral) (!) 32 6' 1\" (1.854 m) 235 lb 6.4 oz (106.8 kg) SpO2 BMI Smoking Status (!) 86% 31.06 kg/m2 Former Smoker BMI and BSA Data Body Mass Index Body Surface Area 31.06 kg/m 2 2.35 m 2 Preferred Pharmacy Pharmacy Name Phone [phone] [phone] Your Updated Medication List  
  
   
This list is accurate as of: 7/17/17 12:14 PM.  Always use your most recent med list.  
  
  
  
  
 albuterol 90 mcg/actuation inhaler Commonly known as:  PROVENTIL HFA, VENTOLIN HFA, PROAIR HFA  
 Take 1 Puff by inhalation every four (4) hours as needed for Wheezing or Shortness of Breath. albuterol-ipratropium 2.5 mg-0.5 mg/3 ml Nebu Commonly known as:  DUO-NEB  
3 mL by Nebulization route every four (4) hours as needed. aspirin delayed-release 81 mg tablet Take 81 mg by mouth daily. atorvastatin 20 mg tablet Commonly known as:  LIPITOR  
TAKE 1 TABLET BY MOUTH EVERY DAY Blood-Glucose Meter monitoring kit Commonly known as:  FREESTYLE LITE METER As directed 250.00  
  
 carvedilol 6.25 mg tablet Commonly known as:  Jaya Hirschfeld Take 1 Tab by mouth two (2) times daily (with meals). CORRECTED RX  
  
 CENTRUM SILVER Tab tablet Generic drug:  multivitamins-minerals-lutein Take 1 Tab by mouth daily. cyanocobalamin 1,000 mcg tablet Take 1,000 mcg by mouth daily. docusate sodium 50 mg capsule Commonly known as:  Cyn Pelaez Take 50 mg by mouth as needed. folic acid 1 mg tablet Commonly known as:  Google Take  by mouth daily. furosemide 80 mg tablet Commonly known as:  LASIX TAKE 1 TABLET BY MOUTH EVERY DAY  
  
 glucose blood VI test strips strip Commonly known as:  FREESTYLE LITE STRIPS  
by Does Not Apply route Daily (before breakfast). DX: E11.65  
  
 ICAPS AREDS PO Take 1 Cap by mouth two (2) times a day. Iron 325 mg (65 mg iron) tablet Generic drug:  ferrous sulfate Take 325 mg by mouth daily. Lancets Misc  
by Does Not Apply route Daily (before breakfast). DX: E11.65 NATURAL SENNA LAXATIVE PO Take  by mouth. OXYGEN-AIR DELIVERY SYSTEMS  
3 L by Does Not Apply route. pantoprazole 40 mg tablet Commonly known as:  PROTONIX Take 1 Tab by mouth two (2) times a day. senna-docusate 8.6-50 mg per tablet Commonly known as:  Annalee Covington Take 3 Tabs by mouth as needed. SITagliptin 100 mg tablet Commonly known as:  Luretha Mariner Take 1 Tab by mouth daily. Take 1/2 tablet (50 mg) daily. TYLENOL 325 mg tablet Generic drug:  acetaminophen Take 650 mg by mouth as needed. umeclidinium-vilanterol 62.5-25 mcg/actuation inhaler Commonly known as:  Dominique Peach Take 1 Puff by inhalation daily. ZyrTEC 10 mg tablet Generic drug:  cetirizine Take 10 mg by mouth nightly. Introducing \[facility] [place] [facility]\"" & [facility] SERVICES! Dear Tj Esposito: 
Thank you for requesting a Yerdle account. Our records indicate that you already have an active Yerdle account. You can access your account anytime at [email or web address]. [email or web address] Did you know that you can access your hospital and ER discharge instructions at any time in Yerdle? You can also review all of your test results from your hospital stay or ER visit. Additional Information If you have questions, please visit the Frequently Asked Questions section of the Yerdle website at [email or web address] Remember, Yerdle is NOT to be used for urgent needs. For medical emergencies, dial 911. Now available from your iPhone and Android! Please provide this summary of care documentation to your next provider. Your primary care clinician is listed as Sara [phone] If you have any questions after today's visit, please call [phone].

## 2017-07-17 NOTE — PROGRESS NOTES
HPI:  Lamine Tucker is a 80y.o. year old male who is here for a transitions of care visit for admit for COPD exacerbation and diverticulitis. Has been home for one week and doing well.      Hospital Follow Up for [facility] Admission from 7/5/17 - 7/11/17 for COPD, diverticulitis.       RRAT score:  Medium     Advance Medical Directive on file in EMR? no this was discussed with Leigh Ann Bojorquez. Says he was asked in hospital what his wishes are. Told nurse that it was fine to \"put a tube down his throat\" and to be resuscitated. When Leigh Ann Bojorquez talked to him, it sounds like he doesn't want any of that. He said he would decide \"when the time comes. \"  Yesterday he told her that he was ready to give up. His DNR status needs to be addressed.       Medical History:          Past Medical History:   Diagnosis Date    Arrhythmia       pacemaker    CAD (coronary artery disease)      Diabetes ([address]      Heart disease      Hypertension      TRACI on CPAP       AHI: 24 per hour    Unspecified sleep apnea       CPAP    Vertigo        This represents Transitions of Care b/c NN spoke with patient and/or caregiver within 1 business days of discharge. Pt's TCM follow up appt is scheduled with Dr. Yuliya Antonio on 7/17/17, which is within 7 days of discharge. He is getting stronger every day and is not getting home health services. COPD is better with little cough and sputum. No fever or chills. No PND or orthopnea. Has not been taking his nebulizers as instructed. Appetite is better. Bowels are good now at least once per day on meds. No abd pain. No bladder issues. No falls and he does not wish to do PT. He is not driving yet and informed him he should do a test drive with the daughter and USE HIS O2 if he is going to drive.        Past Medical History:   Diagnosis Date    Arrhythmia     pacemaker    CAD (coronary artery disease)     Diabetes ([address]     Heart disease     Hypertension     TRACI on CPAP     AHI: 24 per hour    Unspecified sleep apnea     CPAP    Vertigo        Past Surgical History:   Procedure Laterality Date    CARDIAC SURG PROCEDURE UNLIST  2007    valve replacement, one bypass, AAA repair    CARDIAC SURG PROCEDURE UNLIST  2014    HX HEENT      uvulectomy    HX HEENT      teeth    HX HEENT      cateract    HX ORTHOPAEDIC      rotator cuff - rt    HX PACEMAKER         Prior to Admission medications    Medication Sig Start Date End Date Taking? Authorizing Provider   folic acid (FOLVITE) 1 mg tablet Take  by mouth daily. Yes Historical Provider   cyanocobalamin 1,000 mcg tablet Take 1,000 mcg by mouth daily. Yes Historical Provider   SENNOSIDES (NATURAL SENNA LAXATIVE PO) Take  by mouth. Yes Historical Provider   albuterol-ipratropium (DUO-NEB) 2.5 mg-0.5 mg/3 ml nebu 3 mL by Nebulization route every four (4) hours as needed. Yes Historical Provider   carvedilol (COREG) 6.25 mg tablet Take 1 Tab by mouth two (2) times daily (with meals). CORRECTED RX 7/5/17  Yes Saba Roth III, MD   furosemide (LASIX) 80 mg tablet TAKE 1 TABLET BY MOUTH EVERY DAY 6/6/17  Yes Saba Roth III, MD   atorvastatin (LIPITOR) 20 mg tablet TAKE 1 TABLET BY MOUTH EVERY DAY 6/6/17  Yes Monse Cali MD   umeclidinium-vilanterol [facility] ELLIPTA) 62.5-25 mcg/actuation inhaler Take 1 Puff by inhalation daily. 6/2/17  Yes Saba Roth III, MD   SITagliptin (JANUVIA) 100 mg tablet Take 1 Tab by mouth daily. Take 1/2 tablet (50 mg) daily. 3/13/17  Yes Monse Cali MD   Lancets mis by Does Not Apply route Daily (before breakfast). DX: E11.65 2/22/17  Yes Saba Roth III, MD   glucose blood VI test strips (FREESTYLE LITE STRIPS) strip by Does Not Apply route Daily (before breakfast). DX: E11.65 2/22/17  Yes Saba Roth III, MD   pantoprazole (PROTONIX) 40 mg tablet Take 1 Tab by mouth two (2) times a day. 1/31/17  Yes Monse Cali MD   OXYGEN-AIR DELIVERY SYSTEMS 3 L by Does Not Apply route.    Yes Historical Provider albuterol (PROVENTIL HFA, VENTOLIN HFA, PROAIR HFA) 90 mcg/actuation inhaler Take 1 Puff by inhalation every four (4) hours as needed for Wheezing or Shortness of Breath. 2/27/15  Yes Kitty Padilla MD   ferrous sulfate (IRON) 325 mg (65 mg iron) tablet Take 325 mg by mouth daily. 8/24/14  Yes Historical Provider   docusate sodium (COLACE) 50 mg capsule Take 50 mg by mouth as needed. Yes Historical Provider   senna-docusate (PERICOLACE) 8.6-50 mg per tablet Take 3 Tabs by mouth as needed. Yes Historical Provider   cetirizine (ZYRTEC) 10 mg tablet Take 10 mg by mouth nightly. Yes Historical Provider   acetaminophen (TYLENOL) 325 mg tablet Take 650 mg by mouth as needed. Yes Historical Provider   aspirin delayed-release 81 mg tablet Take 81 mg by mouth daily. Yes Historical Provider   VIT A/VIT C/VIT E/ZINC/COPPER (ICAPS AREDS PO) Take 1 Cap by mouth two (2) times a day. Yes Historical Provider   Blood-Glucose Meter (FREESTYLE LITE METER) monitoring kit As directed  250.00 2/25/13  Yes Ysabel Arias MD   multivitamins-minerals-lutein (CENTRUM SILVER) Tab Take 1 Tab by mouth daily. Yes Historical Provider       Social History     Social History    Marital status:      Spouse name: N/A    Number of children: N/A    Years of education: N/A     Occupational History    Not on file.      Social History Main Topics    Smoking status: Former Smoker     Packs/day: 1.50     Years: 17.00     Quit date: 1/1/1969    Smokeless tobacco: Never Used    Alcohol use No    Drug use: No    Sexual activity: Not Currently     Other Topics Concern    Not on file     Social History Narrative          ROS  Per HPI    Visit Vitals    BP 98/60 (BP 1 Location: Left arm, BP Patient Position: Sitting)    Pulse 88  Comment: irreg    Temp 98.2 °F (36.8 °C) (Oral)    Resp (!) 32    Ht 6' 1\" (1.854 m)    Wt 235 lb 6.4 oz (106.8 kg)    SpO2 (!) 86%  Comment: on 3l o2 after sitting 92%    BMI 31.06 kg/m2         Physical Exam   Physical Examination: General appearance - alert, well appearing, and in no distress  Mouth - mucous membranes moist, pharynx normal without lesions  Neck - supple, no significant adenopathy  Lymphatics - no palpable lymphadenopathy, no hepatosplenomegaly  Chest - scattered rhonchi, no wheeze. Heart - normal rate and regular rhythm  Abdomen - soft, nontender, nondistended, no masses or organomegaly  Extremities - peripheral pulses normal, no pedal edema, no clubbing or cyanosis      Assessment/Plan:  COPD - stable and will continue meds. Will use oxygen and nebulizers at least BID. Diverticulitis -stable and continue meds for constipation  CHF - stable  DM - sugars well controlled with no lows. Plan -   Orders Placed This Encounter    folic acid (FOLVITE) 1 mg tablet     Sig: Take  by mouth daily.  cyanocobalamin 1,000 mcg tablet     Sig: Take 1,000 mcg by mouth daily.  SENNOSIDES (NATURAL SENNA LAXATIVE PO)     Sig: Take  by mouth. Follow-up Disposition: 3 months and as needed       Advised him to call back or return to office if symptoms worsen/change/persist.  Discussed expected course/resolution/complications of diagnosis in detail with patient. Medication risks/benefits/costs/interactions/alternatives discussed with patient. He was given an after visit summary which includes diagnoses, current medications, & vitals. He expressed understanding with the diagnosis and plan.

## 2017-07-25 ENCOUNTER — PATIENT OUTREACH (OUTPATIENT)
Dept: INTERNAL MEDICINE CLINIC | Age: 82
End: 2017-07-25

## 2017-07-25 NOTE — PROGRESS NOTES
[address] [facility] [address] Follow Up Phone Call # [phone] Jumana Braun, daughter. Verified him with 2 identifiers. Pt came to [facility] appt with Dr Christina Palomino on 7/17/17. Pt did f/u with Marielena Elliott on 7/18/17. Is weaing o2 24/7hr.    1. Baseline activities - Alejandro Peres says he is doing better and getting back into his routine. Starting to work crossword puzzles again. Took a shower by himself on Sat. His anniversary is coming up so he has been talking about going to seen Junior Singleton. It takes 82 steps from car to get to her gravesite. 2. Depression - Still continues to say \"it is no fun anymore, nothing appeals to me, don't want to eat anything. \"  Denies being depressed. He is eating a little better and she is giving him carnation instant breakfast when he says he doesn't feel like eating. 3 Constipation - 2 senna nightly, which seems to do the trick    Will continue to follow up with patient.   Scheduled his 3 mo f/u with Dr Duane Alaniz  Date Time Provider Pat Antonio   10/4/2017 1:15 PM Monse Cali MD [id number] [facility]

## 2017-07-30 RX ORDER — PANTOPRAZOLE SODIUM 40 MG/1
TABLET, DELAYED RELEASE ORAL
Qty: 180 TAB | Refills: 0 | Status: SHIPPED | OUTPATIENT
Start: 2017-07-30 | End: 2017-10-04 | Stop reason: SDUPTHER

## 2017-08-08 ENCOUNTER — PATIENT OUTREACH (OUTPATIENT)
Dept: INTERNAL MEDICINE CLINIC | Age: 82
End: 2017-08-08

## 2017-08-08 NOTE — PROGRESS NOTES
[address] Follow Up Phone Call #2    Chen Anand, pt's daughter. Verified him with 2 identifiers. Navi Denis he is pretty much back to himself. He is back to driving to get breakfast and/or lunch. He is wearing his o2 routinely. He is taking stool softener nightly to keep him regulated. Still talks about wanting to give up every now and then, but not as frequent as before. Francine Rodrigo is getting ready to go on vacation, so his other children will be watching him.

## 2017-09-04 RX ORDER — ATORVASTATIN CALCIUM 20 MG/1
TABLET, FILM COATED ORAL
Qty: 90 TAB | Refills: 0 | Status: SHIPPED | OUTPATIENT
Start: 2017-09-04 | End: 2017-12-03 | Stop reason: SDUPTHER

## 2017-09-05 ENCOUNTER — PATIENT OUTREACH (OUTPATIENT)
Dept: INTERNAL MEDICINE CLINIC | Age: 82
End: 2017-09-05

## 2017-09-05 NOTE — PROGRESS NOTES
[address] [facility] Follow Up Phone Call #3    Brian Nichols, pt's daughter. Verified Arik Calero with 2 identifiers. Says he has been doing a lot of clearing his throat recently. Denies sore throat. Nimo [place] says his appetite is good. Wearing his o2 full time. Says he is good about drinking water. Encouraged him to continue drinking. Told her to keep eye on him in case he is getting URI. She will call if any concerns. Goes back to see Dr Kaiden Tilley, cataract on L eye in 11/16/17. Closing out open RICARDO episode since pt remained out of hospital > 30 days. Goals have been met.

## 2017-09-22 RX ORDER — FUROSEMIDE 80 MG/1
TABLET ORAL
Qty: 90 TAB | Refills: 0 | Status: SHIPPED | OUTPATIENT
Start: 2017-09-22 | End: 2017-12-18 | Stop reason: SDUPTHER

## 2017-10-04 ENCOUNTER — OFFICE VISIT (OUTPATIENT)
Dept: INTERNAL MEDICINE CLINIC | Age: 82
End: 2017-10-04

## 2017-10-04 VITALS
TEMPERATURE: 98.2 F | RESPIRATION RATE: 22 BRPM | DIASTOLIC BLOOD PRESSURE: 70 MMHG | OXYGEN SATURATION: 92 % | WEIGHT: 242 LBS | SYSTOLIC BLOOD PRESSURE: 116 MMHG | HEART RATE: 87 BPM | BODY MASS INDEX: 32.07 KG/M2 | HEIGHT: 73 IN

## 2017-10-04 DIAGNOSIS — G47.33 OBSTRUCTIVE SLEEP APNEA SYNDROME: ICD-10-CM

## 2017-10-04 DIAGNOSIS — I25.10 CORONARY ARTERY DISEASE INVOLVING NATIVE CORONARY ARTERY OF NATIVE HEART WITHOUT ANGINA PECTORIS: ICD-10-CM

## 2017-10-04 DIAGNOSIS — Z95.2 STATUS POST AORTIC VALVE REPLACEMENT: ICD-10-CM

## 2017-10-04 DIAGNOSIS — E11.65 POORLY CONTROLLED TYPE 2 DIABETES MELLITUS (HCC): Primary | ICD-10-CM

## 2017-10-04 DIAGNOSIS — J43.1 PANLOBULAR EMPHYSEMA (HCC): ICD-10-CM

## 2017-10-04 DIAGNOSIS — I25.5 ISCHEMIC CARDIOMYOPATHY: ICD-10-CM

## 2017-10-04 DIAGNOSIS — N18.30 CRI (CHRONIC RENAL INSUFFICIENCY), STAGE 3 (MODERATE) (HCC): ICD-10-CM

## 2017-10-04 DIAGNOSIS — Z23 NEED FOR TDAP VACCINATION: ICD-10-CM

## 2017-10-04 NOTE — PROGRESS NOTES
HPI:  Bobbi Robb is a 80y.o. year old male who is here for a routine visit:    Here for his routine follow-up visit. He continues to have some mild dyspnea on exertion. He is having no chest pains. No palpitations. No PND orthopnea. He does have follow-up appointments coming up with both nephrology, pulmonology, and cardiology. He is seeing the podiatrist every 3-4 months. He checks his sugar every evening time and they have generally been been between 130 and 150. No low sugar reactions. Past Medical History:   Diagnosis Date    Arrhythmia     pacemaker    CAD (coronary artery disease)     Diabetes ([facility] [address]     Heart disease     Hypertension     TRACI on CPAP     AHI: 24 per hour    Unspecified sleep apnea     CPAP    Vertigo        Past Surgical History:   Procedure Laterality Date    CARDIAC SURG PROCEDURE UNLIST  2007    valve replacement, one bypass, AAA repair    CARDIAC SURG PROCEDURE UNLIST  2014    HX HEENT      uvulectomy    HX HEENT      teeth    HX HEENT      cateract    HX ORTHOPAEDIC      rotator cuff - rt    HX PACEMAKER         Prior to Admission medications    Medication Sig Start Date End Date Taking? Authorizing Provider   Kun Garza,, Tetanus (BOOSTRIX TDAP) 2.5-8-5 Lf-mcg-Lf/0.5mL susp susp 0.5 mL by IntraMUSCular route once for 1 dose. Indications: DIPHTHERIA-PERTUSSIS-TETANUS COMBINED PREVENTION 10/4/17 10/4/17 Yes Pillo Hollins MD   SITagliptin (JANUVIA) 100 mg tablet Take 1 Tab by mouth daily. Take 1/2 tablet (50 mg) daily. 10/4/17  Yes Rebecca Choi III, MD   furosemide (LASIX) 80 mg tablet TAKE 1 TABLET BY MOUTH EVERY DAY 9/22/17  Yes Rebecca Choi III, MD   atorvastatin (LIPITOR) 20 mg tablet TAKE 1 TABLET BY MOUTH EVERY DAY 9/4/17  Yes Rebecca Choi III, MD   folic acid (FOLVITE) 1 mg tablet Take  by mouth daily. Yes Historical Provider   cyanocobalamin 1,000 mcg tablet Take 1,000 mcg by mouth daily.    Yes Historical Provider   SENNOSIDES (NATURAL SENNA LAXATIVE PO) Take  by mouth. Yes Historical Provider   albuterol-ipratropium (DUO-NEB) 2.5 mg-0.5 mg/3 ml nebu 3 mL by Nebulization route every four (4) hours as needed. Yes Historical Provider   carvedilol (COREG) 6.25 mg tablet Take 1 Tab by mouth two (2) times daily (with meals). CORRECTED RX 7/5/17  Yes Todd Greenberg MD   umeclidinium-vilanterol [facility] ELLIPTA) 62.5-25 mcg/actuation inhaler Take 1 Puff by inhalation daily. 6/2/17  Yes Todd Greenberg MD   Lancets misc by Does Not Apply route Daily (before breakfast). DX: E11.65 2/22/17  Yes David Estrella III, MD   glucose blood VI test strips (FREESTYLE LITE STRIPS) strip by Does Not Apply route Daily (before breakfast). DX: E11.65 2/22/17  Yes David Estrella III, MD   pantoprazole (PROTONIX) 40 mg tablet Take 1 Tab by mouth two (2) times a day. 1/31/17  Yes Todd Greenberg MD   OXYGEN-AIR DELIVERY SYSTEMS 3 L by Does Not Apply route. Yes Historical Provider   albuterol (PROVENTIL HFA, VENTOLIN HFA, PROAIR HFA) 90 mcg/actuation inhaler Take 1 Puff by inhalation every four (4) hours as needed for Wheezing or Shortness of Breath. 2/27/15  Yes Lorena Loaiza MD   ferrous sulfate (IRON) 325 mg (65 mg iron) tablet Take 325 mg by mouth daily. 8/24/14  Yes Historical Provider   docusate sodium (COLACE) 50 mg capsule Take 50 mg by mouth as needed. Yes Historical Provider   senna-docusate (PERICOLACE) 8.6-50 mg per tablet Take 3 Tabs by mouth as needed. Yes Historical Provider   cetirizine (ZYRTEC) 10 mg tablet Take 10 mg by mouth nightly. Yes Historical Provider   acetaminophen (TYLENOL) 325 mg tablet Take 650 mg by mouth as needed. Yes Historical Provider   aspirin delayed-release 81 mg tablet Take 81 mg by mouth daily. Yes Historical Provider   VIT A/VIT C/VIT E/ZINC/COPPER (ICAPS AREDS PO) Take 1 Cap by mouth two (2) times a day.    Yes Historical Provider   Blood-Glucose Meter (FREESTYLE LITE METER) monitoring kit As directed  250.00 2/25/13  Yes Keysha Romero MD   multivitamins-minerals-lutein (CENTRUM SILVER) Tab Take 1 Tab by mouth daily. Yes Historical Provider       Social History     Social History    Marital status:      Spouse name: N/A    Number of children: N/A    Years of education: N/A     Occupational History    Not on file. Social History Main Topics    Smoking status: Former Smoker     Packs/day: 1.50     Years: 17.00     Quit date: 1/1/1969    Smokeless tobacco: Never Used    Alcohol use No    Drug use: No    Sexual activity: Not Currently     Other Topics Concern    Not on file     Social History Narrative          ROS  Per HPI    Visit Vitals    /70    Pulse 87    Temp 98.2 °F (36.8 °C) (Oral)    Resp 22    Ht 6' 1\" (1.854 m)    Wt 242 lb (109.8 kg)    SpO2 92%    BMI 31.93 kg/m2         Physical Exam   Physical Examination: General appearance - alert, well appearing, and in no distress  Mouth - mucous membranes moist, pharynx normal without lesions  Neck - supple, no significant adenopathy  Lymphatics - no palpable lymphadenopathy, no hepatosplenomegaly  Chest - clear to auscultation, no wheezes, rales or rhonchi, symmetric air entry  Heart - normal rate and regular rhythm  Abdomen - soft, nontender, nondistended, no masses or organomegaly  Extremities - peripheral pulses normal, no pedal edema, no clubbing or cyanosis      Assessment/Plan:  Diagnoses and all orders for this visit:    1. Poorly controlled type 2 diabetes mellitus ([address] we will check labs for stability. if his A1c has gone up consider an increases in his Januvia to 100 mg per day. -     HEMOGLOBIN A1C WITH EAG  -     METABOLIC PANEL, COMPREHENSIVE  -     LIPID PANEL    2. Need for Tdap vaccination  -     Benjamin Holt,, Tetanus (BOOSTRIX TDAP) 2.5-8-5 Lf-mcg-Lf/0.5mL susp susp; 0.5 mL by IntraMUSCular route once for 1 dose. Indications: DIPHTHERIA-PERTUSSIS-TETANUS COMBINED PREVENTION    3. Obstructive sleep apnea syndrome stable and seeing pulmonology for follow-up. 4. Panlobular emphysema ([facility] [address]    5. Coronary artery disease involving native coronary artery of native heart without angina pectoris stable and seeing cardiology for follow-up. 6. Status post aortic valve replacement    7. Ischemic cardiomyopathy    8. CRI (chronic renal insufficiency), stage 3 (moderate) renal function has been stable. Recheck labs and provide these to his nephrologist as well. Other orders  -     SITagliptin (JANUVIA) 100 mg tablet; Take 1 Tab by mouth daily. Take 1/2 tablet (50 mg) daily. We discussed a flu shot today and he wishes to defer that until later in the season. Follow-up Disposition: 6 months. Advised him to call back or return to office if symptoms worsen/change/persist.  Discussed expected course/resolution/complications of diagnosis in detail with patient. Medication risks/benefits/costs/interactions/alternatives discussed with patient. He was given an after visit summary which includes diagnoses, current medications, & vitals. He expressed understanding with the diagnosis and plan.

## 2017-10-04 NOTE — PATIENT INSTRUCTIONS
As discussed in your appointment today, [address] is an important part of planning for your healthcare future. Discussing your preferences with your family and your care team is a part of good healthcare so that we can be guided by your known values and goals. Our office offers this service for you at your convenience. Our Nurse Navigators and certified Respecting Choices ® Facilitators, Maria Luisa Archer and Roman Rockwell typically schedule family appointments for this service on Wednesdays. To schedule an Advance Care Planning visit or to receive more information about this service, please call [facility] at [phone] and ask to speak directly to Annette Carlton or [employer]. Advance Care Planning: Care Instructions  Your Care Instructions  It can be hard to live with an illness that cannot be cured. But if your health is getting worse, you may want to make decisions about end-of-life care. Planning for the end of your life does not mean that you are giving up. It is a way to make sure that your wishes are met. Clearly stating your wishes can make it easier for your loved ones. Making plans while you are still able may also ease your mind and make your final days less stressful and more meaningful. Follow-up care is a key part of your treatment and safety. Be sure to make and go to all appointments, and call your doctor if you are having problems. It's also a good idea to know your test results and keep a list of the medicines you take. What can you do to plan for the end of life? · You can bring these issues up with your doctor. You do not need to wait until your doctor starts the conversation. You might start with \"I would not be willing to live with . Loletta Nunnery Loletta Nunnery Loletta Nunnery \" When you complete this sentence it helps your doctor understand your wishes. · Talk openly and honestly with your doctor. This is the best way to understand the decisions you will need to make as your health changes.  Know that you can always change your mind. · Ask your doctor about commonly used life-support measures. These include tube feedings, breathing machines, and fluids given through a vein (IV). Understanding these treatments will help you decide whether you want them. · You may choose to have these life-supporting treatments for a limited time. This allows a trial period to see whether they will help you. You may also decide that you want your doctor to take only certain measures to keep you alive. It is important to spell out these conditions so that your doctor and family understand them. · Talk to your doctor about how long you are likely to live. He or she may be able to give you an idea of what usually happens with your specific illness. · Think about preparing papers that state your wishes. This way there will not be any confusion about what you want. You can change your instructions at any time. Which papers should you prepare? Advance directives are legal papers that tell doctors how you want to be cared for at the end of your life. You do not need a  to write these papers. Ask your doctor or your state health department for information on how to write your advance directives. They may have the forms for each of these types of papers. Make sure your doctor has a copy of these on file, and give a copy to a family member or close friend. · Consider a do-not-resuscitate order (DNR). This order asks that no extra treatments be done if your heart stops or you stop breathing. Extra treatments may include electrical shock to restart your heart or a machine to breathe for you. If you decide to have a DNR order, ask your doctor to explain and write it. Place the order in your home where everyone can easily see it. · Consider a living will. A living will explains your wishes in case you are in a coma or cannot communicate. Living barclay tell doctors to use or not use treatments that would keep you alive.  You must have one or two witnesses or a notary present when you sign this form. · Consider a durable power of . This allows you to name a person to make decisions about your care if you are not able to. Most people ask a close friend or family member. Talk to this person about the kinds of treatments you want and those that you do not want. Make sure this person understands your wishes. If this person is not the health care agent named in your advance directive, also talk with your health care agent. These legal papers are simple to change. Tell your doctor what you want to change, and ask him or her to make a note in your medical file. Give your family updated copies of the papers.

## 2017-10-12 LAB — EF %, EXTERNAL: NORMAL

## 2017-10-13 ENCOUNTER — HOSPITAL ENCOUNTER (OUTPATIENT)
Dept: LAB | Age: 82
Discharge: HOME | End: 2017-10-13
Payer: MEDICARE

## 2017-10-13 PROCEDURE — 80053 COMPREHEN METABOLIC PANEL: CPT

## 2017-10-13 PROCEDURE — 36415 COLL VENOUS BLD VENIPUNCTURE: CPT

## 2017-10-13 PROCEDURE — 80061 LIPID PANEL: CPT

## 2017-10-13 PROCEDURE — 83036 HEMOGLOBIN GLYCOSYLATED A1C: CPT

## 2017-10-14 LAB
ALBUMIN SERPL-MCNC: 3.8 G/DL (ref 3.5–4.7)
ALBUMIN/GLOB SERPL: 1.3 {RATIO} (ref 1.2–2.2)
ALP SERPL-CCNC: 55 IU/L (ref 39–117)
ALT SERPL-CCNC: 10 IU/L (ref 0–44)
AST SERPL-CCNC: 16 IU/L (ref 0–40)
BILIRUB SERPL-MCNC: 0.4 MG/DL (ref 0–1.2)
BUN SERPL-MCNC: 28 MG/DL (ref 8–27)
BUN/CREAT SERPL: 18 (ref 10–24)
CALCIUM SERPL-MCNC: 8.6 MG/DL (ref 8.6–10.2)
CHLORIDE SERPL-SCNC: 96 MMOL/L (ref 96–106)
CHOLEST SERPL-MCNC: 160 MG/DL (ref 100–199)
CO2 SERPL-SCNC: 32 MMOL/L (ref 18–29)
CREAT SERPL-MCNC: 1.59 MG/DL (ref 0.76–1.27)
EST. AVERAGE GLUCOSE BLD GHB EST-MCNC: 166 MG/DL
GLOBULIN SER CALC-MCNC: 2.9 G/DL (ref 1.5–4.5)
GLUCOSE SERPL-MCNC: 146 MG/DL (ref 65–99)
HBA1C MFR BLD: 7.4 % (ref 4.8–5.6)
HDLC SERPL-MCNC: 45 MG/DL
LDLC SERPL CALC-MCNC: 93 MG/DL (ref 0–99)
POTASSIUM SERPL-SCNC: 4.4 MMOL/L (ref 3.5–5.2)
PROT SERPL-MCNC: 6.7 G/DL (ref 6–8.5)
SODIUM SERPL-SCNC: 142 MMOL/L (ref 134–144)
TRIGL SERPL-MCNC: 110 MG/DL (ref 0–149)
VLDLC SERPL CALC-MCNC: 22 MG/DL (ref 5–40)

## 2017-11-07 LAB
CREATININE, EXTERNAL: 1.64
HBA1C MFR BLD HPLC: 7.4 %
LDL-C, EXTERNAL: 88
MICROALBUMIN UR TEST STR-MCNC: 16.9 MG/DL

## 2017-11-16 RX ORDER — PANTOPRAZOLE SODIUM 40 MG/1
TABLET, DELAYED RELEASE ORAL
Qty: 180 TAB | Refills: 0 | Status: SHIPPED | OUTPATIENT
Start: 2017-11-16 | End: 2018-02-12 | Stop reason: SDUPTHER

## 2017-12-03 RX ORDER — ATORVASTATIN CALCIUM 20 MG/1
TABLET, FILM COATED ORAL
Qty: 90 TAB | Refills: 0 | Status: SHIPPED | OUTPATIENT
Start: 2017-12-03 | End: 2018-01-01 | Stop reason: SDUPTHER

## 2017-12-18 RX ORDER — FUROSEMIDE 80 MG/1
TABLET ORAL
Qty: 90 TAB | Refills: 0 | Status: SHIPPED | OUTPATIENT
Start: 2017-12-18 | End: 2018-01-01 | Stop reason: SDUPTHER

## 2018-01-01 ENCOUNTER — HOSPITAL ENCOUNTER (OUTPATIENT)
Dept: LAB | Age: 83
Discharge: HOME | End: 2018-09-26
Payer: MEDICARE

## 2018-01-01 ENCOUNTER — PATIENT OUTREACH (OUTPATIENT)
Dept: INTERNAL MEDICINE CLINIC | Age: 83
End: 2018-01-01

## 2018-01-01 ENCOUNTER — HOSPITAL ENCOUNTER (OUTPATIENT)
Dept: LAB | Age: 83
Discharge: HOME | End: 2018-10-12
Payer: MEDICARE

## 2018-01-01 ENCOUNTER — TELEPHONE (OUTPATIENT)
Dept: INTERNAL MEDICINE CLINIC | Age: 83
End: 2018-01-01

## 2018-01-01 ENCOUNTER — OFFICE VISIT (OUTPATIENT)
Dept: INTERNAL MEDICINE CLINIC | Age: 83
End: 2018-01-01

## 2018-01-01 ENCOUNTER — HOSPITAL ENCOUNTER (OUTPATIENT)
Dept: LAB | Age: 83
Discharge: HOME | End: 2018-10-29
Payer: MEDICARE

## 2018-01-01 VITALS
HEART RATE: 131 BPM | OXYGEN SATURATION: 91 % | TEMPERATURE: 97.9 F | SYSTOLIC BLOOD PRESSURE: 90 MMHG | DIASTOLIC BLOOD PRESSURE: 53 MMHG | WEIGHT: 236 LBS | HEIGHT: 73 IN | BODY MASS INDEX: 31.28 KG/M2 | RESPIRATION RATE: 34 BRPM

## 2018-01-01 VITALS
BODY MASS INDEX: 30.62 KG/M2 | RESPIRATION RATE: 14 BRPM | HEIGHT: 73 IN | OXYGEN SATURATION: 92 % | DIASTOLIC BLOOD PRESSURE: 62 MMHG | WEIGHT: 231 LBS | SYSTOLIC BLOOD PRESSURE: 137 MMHG | HEART RATE: 72 BPM | TEMPERATURE: 97.4 F

## 2018-01-01 VITALS
DIASTOLIC BLOOD PRESSURE: 60 MMHG | WEIGHT: 238 LBS | HEIGHT: 73 IN | OXYGEN SATURATION: 95 % | SYSTOLIC BLOOD PRESSURE: 122 MMHG | TEMPERATURE: 98.5 F | BODY MASS INDEX: 31.54 KG/M2 | HEART RATE: 88 BPM

## 2018-01-01 DIAGNOSIS — Z87.19 HISTORY OF DUODENAL ULCER: ICD-10-CM

## 2018-01-01 DIAGNOSIS — N18.30 CRI (CHRONIC RENAL INSUFFICIENCY), STAGE 3 (MODERATE) (HCC): ICD-10-CM

## 2018-01-01 DIAGNOSIS — J44.1 COPD EXACERBATION (HCC): Primary | ICD-10-CM

## 2018-01-01 DIAGNOSIS — Z87.440 HX: UTI (URINARY TRACT INFECTION): Primary | ICD-10-CM

## 2018-01-01 DIAGNOSIS — I73.9 PVD (PERIPHERAL VASCULAR DISEASE) (HCC): ICD-10-CM

## 2018-01-01 DIAGNOSIS — R31.9 HEMATURIA, UNSPECIFIED TYPE: Primary | ICD-10-CM

## 2018-01-01 DIAGNOSIS — E11.21 TYPE 2 DIABETES WITH NEPHROPATHY (HCC): Primary | ICD-10-CM

## 2018-01-01 DIAGNOSIS — R53.1 WEAKNESS GENERALIZED: ICD-10-CM

## 2018-01-01 DIAGNOSIS — N39.0 URINARY TRACT INFECTION WITHOUT HEMATURIA, SITE UNSPECIFIED: Primary | ICD-10-CM

## 2018-01-01 DIAGNOSIS — Z87.19 HISTORY OF DUODENAL ULCER: Primary | ICD-10-CM

## 2018-01-01 DIAGNOSIS — N17.9 AKI (ACUTE KIDNEY INJURY) (HCC): ICD-10-CM

## 2018-01-01 DIAGNOSIS — I25.10 CORONARY ARTERY DISEASE INVOLVING NATIVE CORONARY ARTERY OF NATIVE HEART WITHOUT ANGINA PECTORIS: ICD-10-CM

## 2018-01-01 DIAGNOSIS — I25.5 ISCHEMIC CARDIOMYOPATHY: ICD-10-CM

## 2018-01-01 DIAGNOSIS — J43.1 PANLOBULAR EMPHYSEMA (HCC): ICD-10-CM

## 2018-01-01 DIAGNOSIS — D62 POSTOPERATIVE ANEMIA DUE TO ACUTE BLOOD LOSS: ICD-10-CM

## 2018-01-01 LAB
ALBUMIN SERPL-MCNC: 3.5 G/DL (ref 3.5–4.7)
ALBUMIN/GLOB SERPL: 1.2 {RATIO} (ref 1.2–2.2)
ALP SERPL-CCNC: 66 IU/L (ref 39–117)
ALT SERPL-CCNC: 8 IU/L (ref 0–44)
APPEARANCE UR: ABNORMAL
APPEARANCE UR: ABNORMAL
APPEARANCE UR: CLEAR
AST SERPL-CCNC: 12 IU/L (ref 0–40)
BACTERIA #/AREA URNS HPF: ABNORMAL /[HPF]
BACTERIA UR CULT: ABNORMAL
BACTERIA UR CULT: ABNORMAL
BACTERIA UR CULT: NORMAL
BASOPHILS # BLD AUTO: 0 X10E3/UL (ref 0–0.2)
BASOPHILS # BLD AUTO: 0 X10E3/UL (ref 0–0.2)
BASOPHILS NFR BLD AUTO: 0 %
BASOPHILS NFR BLD AUTO: 0 %
BILIRUB SERPL-MCNC: 0.3 MG/DL (ref 0–1.2)
BILIRUB UR QL STRIP: NEGATIVE
BUN SERPL-MCNC: 21 MG/DL (ref 8–27)
BUN SERPL-MCNC: 25 MG/DL (ref 8–27)
BUN/CREAT SERPL: 14 (ref 10–24)
BUN/CREAT SERPL: 19 (ref 10–24)
CALCIUM SERPL-MCNC: 8.9 MG/DL (ref 8.6–10.2)
CALCIUM SERPL-MCNC: 8.9 MG/DL (ref 8.6–10.2)
CASTS URNS QL MICRO: ABNORMAL /LPF
CHLORIDE SERPL-SCNC: 95 MMOL/L (ref 96–106)
CHLORIDE SERPL-SCNC: 97 MMOL/L (ref 96–106)
CO2 SERPL-SCNC: 29 MMOL/L (ref 20–29)
CO2 SERPL-SCNC: 32 MMOL/L (ref 20–29)
COLOR UR: ABNORMAL
COLOR UR: YELLOW
COLOR UR: YELLOW
CREAT SERPL-MCNC: 1.33 MG/DL (ref 0.76–1.27)
CREAT SERPL-MCNC: 1.49 MG/DL (ref 0.76–1.27)
CREATININE, EXTERNAL: 1.48
EF %, EXTERNAL: NORMAL
EOSINOPHIL # BLD AUTO: 0.1 X10E3/UL (ref 0–0.4)
EOSINOPHIL # BLD AUTO: 0.2 X10E3/UL (ref 0–0.4)
EOSINOPHIL NFR BLD AUTO: 2 %
EOSINOPHIL NFR BLD AUTO: 4 %
EPI CELLS #/AREA URNS HPF: ABNORMAL /HPF
ERYTHROCYTE [DISTWIDTH] IN BLOOD BY AUTOMATED COUNT: 13.9 % (ref 12.3–15.4)
ERYTHROCYTE [DISTWIDTH] IN BLOOD BY AUTOMATED COUNT: 15.9 % (ref 12.3–15.4)
EST. AVERAGE GLUCOSE BLD GHB EST-MCNC: 186 MG/DL
GLOBULIN SER CALC-MCNC: 3 G/DL (ref 1.5–4.5)
GLUCOSE SERPL-MCNC: 138 MG/DL (ref 65–99)
GLUCOSE SERPL-MCNC: 166 MG/DL (ref 65–99)
GLUCOSE UR QL: NEGATIVE
HBA1C MFR BLD: 8.1 % (ref 4.8–5.6)
HCT VFR BLD AUTO: 31.2 % (ref 37.5–51)
HCT VFR BLD AUTO: 38 % (ref 37.5–51)
HGB BLD-MCNC: 10.6 G/DL (ref 13–17.7)
HGB BLD-MCNC: 12.3 G/DL (ref 13–17.7)
HGB UR QL STRIP: ABNORMAL
HGB UR QL STRIP: ABNORMAL
HGB UR QL STRIP: NEGATIVE
IMM GRANULOCYTES # BLD: 0 X10E3/UL (ref 0–0.1)
IMM GRANULOCYTES NFR BLD: 1 %
IMMATURE CELLS, 115398: ABNORMAL
KETONES UR QL STRIP: NEGATIVE
LEUKOCYTE ESTERASE UR QL STRIP: ABNORMAL
LYMPHOCYTES # BLD AUTO: 1 X10E3/UL (ref 0.7–3.1)
LYMPHOCYTES # BLD AUTO: 1.2 X10E3/UL (ref 0.7–3.1)
LYMPHOCYTES NFR BLD AUTO: 18 %
LYMPHOCYTES NFR BLD AUTO: 20 %
MCH RBC QN AUTO: 31.4 PG (ref 26.6–33)
MCH RBC QN AUTO: 32.1 PG (ref 26.6–33)
MCHC RBC AUTO-ENTMCNC: 32.4 G/DL (ref 31.5–35.7)
MCHC RBC AUTO-ENTMCNC: 34 G/DL (ref 31.5–35.7)
MCV RBC AUTO: 95 FL (ref 79–97)
MCV RBC AUTO: 97 FL (ref 79–97)
MICRO URNS: ABNORMAL
MONOCYTES # BLD AUTO: 0.7 X10E3/UL (ref 0.1–0.9)
MONOCYTES # BLD AUTO: 0.7 X10E3/UL (ref 0.1–0.9)
MONOCYTES NFR BLD AUTO: 11 %
MONOCYTES NFR BLD AUTO: 15 %
MORPHOLOGY BLD-IMP: ABNORMAL
MUCOUS THREADS URNS QL MICRO: PRESENT
MYELOCYTES NFR BLD: 3 %
NEUTROPHILS # BLD AUTO: 3 X10E3/UL (ref 1.4–7)
NEUTROPHILS # BLD AUTO: 4.5 X10E3/UL (ref 1.4–7)
NEUTROPHILS NFR BLD AUTO: 60 %
NEUTROPHILS NFR BLD AUTO: 66 %
NITRITE UR QL STRIP: NEGATIVE
NITRITE UR QL STRIP: NEGATIVE
NITRITE UR QL STRIP: POSITIVE
NT-PROBNP SERPL-MCNC: 605 PG/ML (ref 0–486)
PH UR STRIP: 6 [PH] (ref 5–7.5)
PH UR STRIP: 6.5 [PH] (ref 5–7.5)
PH UR STRIP: 7 [PH] (ref 5–7.5)
PLATELET # BLD AUTO: 167 X10E3/UL (ref 150–379)
PLATELET # BLD AUTO: 282 X10E3/UL (ref 150–379)
POTASSIUM SERPL-SCNC: 4.4 MMOL/L (ref 3.5–5.2)
POTASSIUM SERPL-SCNC: 4.8 MMOL/L (ref 3.5–5.2)
PROT SERPL-MCNC: 6.5 G/DL (ref 6–8.5)
PROT UR QL STRIP: ABNORMAL
PROT UR QL STRIP: NEGATIVE
PROT UR QL STRIP: NEGATIVE
RBC # BLD AUTO: 3.3 X10E6/UL (ref 4.14–5.8)
RBC # BLD AUTO: 3.92 X10E6/UL (ref 4.14–5.8)
RBC #/AREA URNS HPF: >30 /HPF
RBC #/AREA URNS HPF: ABNORMAL /HPF
RBC #/AREA URNS HPF: ABNORMAL /HPF
SODIUM SERPL-SCNC: 139 MMOL/L (ref 134–144)
SODIUM SERPL-SCNC: 141 MMOL/L (ref 134–144)
SP GR UR: 1.01 (ref 1–1.03)
SP GR UR: 1.01 (ref 1–1.03)
SP GR UR: 1.02 (ref 1–1.03)
T4 FREE SERPL-MCNC: 1.34 NG/DL (ref 0.82–1.77)
TSH SERPL DL<=0.005 MIU/L-ACNC: 0.27 UIU/ML (ref 0.45–4.5)
URINALYSIS REFLEX, 377202: ABNORMAL
URINALYSIS REFLEX, 377202: ABNORMAL
UROBILINOGEN UR STRIP-MCNC: 0.2 MG/DL (ref 0.2–1)
WBC # BLD AUTO: 5 X10E3/UL (ref 3.4–10.8)
WBC # BLD AUTO: 6.8 X10E3/UL (ref 3.4–10.8)
WBC #/AREA URNS HPF: >30 /HPF
WBC #/AREA URNS HPF: >30 /HPF
WBC #/AREA URNS HPF: ABNORMAL /HPF

## 2018-01-01 PROCEDURE — 36415 COLL VENOUS BLD VENIPUNCTURE: CPT

## 2018-01-01 PROCEDURE — 83880 ASSAY OF NATRIURETIC PEPTIDE: CPT

## 2018-01-01 PROCEDURE — 87077 CULTURE AEROBIC IDENTIFY: CPT

## 2018-01-01 PROCEDURE — 81001 URINALYSIS AUTO W/SCOPE: CPT

## 2018-01-01 PROCEDURE — 84439 ASSAY OF FREE THYROXINE: CPT

## 2018-01-01 PROCEDURE — 87086 URINE CULTURE/COLONY COUNT: CPT

## 2018-01-01 PROCEDURE — 87186 SC STD MICRODIL/AGAR DIL: CPT

## 2018-01-01 PROCEDURE — 87088 URINE BACTERIA CULTURE: CPT

## 2018-01-01 PROCEDURE — 85025 COMPLETE CBC W/AUTO DIFF WBC: CPT

## 2018-01-01 PROCEDURE — 80053 COMPREHEN METABOLIC PANEL: CPT

## 2018-01-01 PROCEDURE — 84443 ASSAY THYROID STIM HORMONE: CPT

## 2018-01-01 PROCEDURE — 83036 HEMOGLOBIN GLYCOSYLATED A1C: CPT

## 2018-01-01 PROCEDURE — 80048 BASIC METABOLIC PNL TOTAL CA: CPT

## 2018-01-01 RX ORDER — BLOOD-GLUCOSE METER
KIT MISCELLANEOUS
Qty: 300 STRIP | Refills: 0 | Status: SHIPPED | OUTPATIENT
Start: 2018-01-01

## 2018-01-01 RX ORDER — CARVEDILOL 6.25 MG/1
TABLET ORAL
Qty: 180 TAB | Refills: 0 | Status: SHIPPED | OUTPATIENT
Start: 2018-01-01

## 2018-01-01 RX ORDER — FUROSEMIDE 80 MG/1
TABLET ORAL
Qty: 90 TAB | Refills: 0 | Status: SHIPPED | OUTPATIENT
Start: 2018-01-01 | End: 2018-01-01 | Stop reason: SDUPTHER

## 2018-01-01 RX ORDER — PANTOPRAZOLE SODIUM 40 MG/1
TABLET, DELAYED RELEASE ORAL
Qty: 60 TAB | Refills: 0 | Status: SHIPPED | OUTPATIENT
Start: 2018-01-01 | End: 2018-01-01 | Stop reason: SDUPTHER

## 2018-01-01 RX ORDER — CLOPIDOGREL BISULFATE 75 MG/1
TABLET ORAL
COMMUNITY

## 2018-01-01 RX ORDER — ATORVASTATIN CALCIUM 20 MG/1
TABLET, FILM COATED ORAL
Qty: 90 TAB | Refills: 0 | Status: SHIPPED | OUTPATIENT
Start: 2018-01-01 | End: 2019-01-01 | Stop reason: SDUPTHER

## 2018-01-01 RX ORDER — UMECLIDINIUM BROMIDE AND VILANTEROL TRIFENATATE 62.5; 25 UG/1; UG/1
POWDER RESPIRATORY (INHALATION)
Qty: 3 INHALER | Refills: 3 | Status: SHIPPED | OUTPATIENT
Start: 2018-01-01

## 2018-01-01 RX ORDER — SULFAMETHOXAZOLE AND TRIMETHOPRIM 800; 160 MG/1; MG/1
1 TABLET ORAL 2 TIMES DAILY
Qty: 14 TAB | Refills: 0 | Status: SHIPPED | OUTPATIENT
Start: 2018-01-01 | End: 2018-01-01

## 2018-01-01 RX ORDER — ATORVASTATIN CALCIUM 20 MG/1
TABLET, FILM COATED ORAL
Qty: 90 TAB | Refills: 0 | Status: SHIPPED | OUTPATIENT
Start: 2018-01-01 | End: 2018-01-01 | Stop reason: SDUPTHER

## 2018-01-01 RX ORDER — PANTOPRAZOLE SODIUM 40 MG/1
TABLET, DELAYED RELEASE ORAL
Qty: 180 TAB | Refills: 1 | Status: SHIPPED | OUTPATIENT
Start: 2018-01-01

## 2018-01-01 RX ORDER — PANTOPRAZOLE SODIUM 40 MG/1
40 TABLET, DELAYED RELEASE ORAL 2 TIMES DAILY
Qty: 60 TAB | Refills: 0 | Status: SHIPPED | OUTPATIENT
Start: 2018-01-01 | End: 2018-01-01 | Stop reason: SDUPTHER

## 2018-01-01 RX ORDER — PANTOPRAZOLE SODIUM 40 MG/1
TABLET, DELAYED RELEASE ORAL
Qty: 180 TAB | Refills: 0 | Status: SHIPPED | OUTPATIENT
Start: 2018-01-01 | End: 2018-01-01 | Stop reason: SDUPTHER

## 2018-01-01 RX ORDER — PANTOPRAZOLE SODIUM 40 MG/1
40 TABLET, DELAYED RELEASE ORAL DAILY
Qty: 180 TAB | Refills: 0 | Status: SHIPPED | OUTPATIENT
Start: 2018-01-01 | End: 2018-01-01

## 2018-01-01 RX ORDER — LANCETS 28 GAUGE
EACH MISCELLANEOUS
Qty: 300 LANCET | Refills: 11 | Status: SHIPPED | OUTPATIENT
Start: 2018-01-01

## 2018-01-01 RX ORDER — BLOOD-GLUCOSE METER
KIT MISCELLANEOUS
Qty: 300 STRIP | Refills: 0 | Status: SHIPPED | OUTPATIENT
Start: 2018-01-01 | End: 2018-01-01 | Stop reason: SDUPTHER

## 2018-01-01 RX ORDER — CARVEDILOL 6.25 MG/1
TABLET ORAL
Qty: 180 TAB | Refills: 0 | Status: SHIPPED | OUTPATIENT
Start: 2018-01-01 | End: 2018-01-01 | Stop reason: SDUPTHER

## 2018-01-01 RX ORDER — FUROSEMIDE 80 MG/1
TABLET ORAL
Qty: 90 TAB | Refills: 0 | Status: SHIPPED | OUTPATIENT
Start: 2018-01-01

## 2018-02-12 RX ORDER — PANTOPRAZOLE SODIUM 40 MG/1
TABLET, DELAYED RELEASE ORAL
Qty: 180 TAB | Refills: 0 | Status: SHIPPED | OUTPATIENT
Start: 2018-02-12 | End: 2018-01-01 | Stop reason: SDUPTHER

## 2018-08-09 NOTE — TELEPHONE ENCOUNTER
Chief Complaint   Patient presents with    Medication Refill     Last Appointment with Dr. Marilu Keenan:  10/4/2017  No future appointments. Orders Placed This Encounter    pantoprazole (PROTONIX) 40 mg tablet     Sig: Take 1 Tab by mouth daily for 90 days. Dispense:  180 Tab     Refill:  0     90 day approved this time - patient needs f/u visit with PCP for future 90 day supplies.

## 2018-08-23 NOTE — TELEPHONE ENCOUNTER
Orders Placed This Encounter    SITagliptin (JANUVIA) 100 mg tablet     Sig: Take 1 Tab by mouth daily. Take 1/2 tablet (50 mg) daily. Dispense:  28 Tab     Refill:  0     Order Specific Question:   Expiration Date     Answer:   2/28/2019     Order Specific Question:   Lot#     Answer:   [id number]     Order Specific Question:        Answer:   [employer]     Order Specific Question:   NDC#     Answer:   n/a     The above medication refills were approved via verbal order by Dr. Teo Morales.

## 2018-09-26 PROBLEM — E11.21 TYPE 2 DIABETES WITH NEPHROPATHY (HCC): Status: ACTIVE | Noted: 2018-01-01

## 2018-09-27 NOTE — PROGRESS NOTES
HPI: 
Christoph Padilla is a 80y.o. year old male who is here for a routine visit: 
 
Here for follow-up visit. 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 Since then he has had progressive increasing issues with fatigue. He has not been using his oxygen regularly. On arrival today after walking down the hallway his pulse ox was 84 with a heart rate of 124. He has not been using his portable oxygen. His appetite has been poor. Denies any nausea or vomiting. No change in bowel or bladder habits. He is using laxatives regularly for his bowels. No melena or hematochezia. No chest pains. No PND or orthopnea. Past Medical History:  
Diagnosis Date  Arrhythmia   
 pacemaker  CAD (coronary artery disease)  Diabetes ([address]  Heart disease  History of duodenal ulcer 2014  Hypertension  TRACI on CPAP   
 AHI: 24 per hour  Unspecified sleep apnea CPAP  Vertigo Past Surgical History:  
Procedure Laterality Date  CARDIAC SURG PROCEDURE UNLIST  2007  
 valve replacement, one bypass, AAA repair  CARDIAC SURG PROCEDURE UNLIST  2014  HX HEENT    
 uvulectomy  HX HEENT    
 teeth  HX HEENT    
 cateract  HX ORTHOPAEDIC    
 rotator cuff - rt  HX PACEMAKER Prior to Admission medications Medication Sig Start Date End Date Taking? Authorizing Provider  
clopidogrel (PLAVIX) 75 mg tab Take  by mouth. Yes Historical Provider  
pantoprazole (PROTONIX) 40 mg tablet TAKE 1 TABLET BY MOUTH TWICE DAILY 9/7/18  Yes Irene Luna III, MD  
furosemide (LASIX) 80 mg tablet TAKE 1 TABLET BY MOUTH EVERY DAY 9/3/18  Yes Irene Luna III, MD  
atorvastatin (LIPITOR) 20 mg tablet TAKE 1 TABLET BY MOUTH EVERY DAY 8/28/18  Yes Irene Luna III, MD  
SITagliptin (JANUVIA) 100 mg tablet Take 1 Tab by mouth daily. Take 1/2 tablet (50 mg) daily.  8/23/18  Yes Do Gallegos MD  
 carvedilol (COREG) 6.25 mg tablet TAKE 1 TABLET BY MOUTH TWICE DAILY WITH MEALS 7/6/18  Yes Oneil Rebolledo MD  
[facility] ELLIPTA 62.5-25 mcg/actuation inhaler INHALE 1 PUFF BY MOUTH EVERY DAY 7/2/18  Yes MD MILA Méndez III LANCETS 28 gauge misc USE AS DIRECTED DAILY BEFORE BREAKFAST. 4/27/18  Yes MD MILA Méndez III LITE STRIPS strip USE TO TEST BLOOD SUGAR DAILY BEFORE BREAKFAST. 3/5/18  Yes Jerry Atkins III, MD  
folic acid (FOLVITE) 1 mg tablet Take  by mouth daily. Yes Historical Provider  
cyanocobalamin 1,000 mcg tablet Take 1,000 mcg by mouth daily. Yes Historical Provider SENNOSIDES (NATURAL SENNA LAXATIVE PO) Take  by mouth. Yes Historical Provider  
albuterol-ipratropium (DUO-NEB) 2.5 mg-0.5 mg/3 ml nebu 3 mL by Nebulization route every four (4) hours as needed. Yes Historical Provider Lancets misc by Does Not Apply route Daily (before breakfast). DX: E11.65 2/22/17  Yes Oneil Rebolledo MD  
OXYGEN-AIR DELIVERY SYSTEMS 2 L by Does Not Apply route. Yes Historical Provider  
albuterol (PROVENTIL HFA, VENTOLIN HFA, PROAIR HFA) 90 mcg/actuation inhaler Take 1 Puff by inhalation every four (4) hours as needed for Wheezing or Shortness of Breath. 2/27/15  Yes Sheba Neal MD  
ferrous sulfate (IRON) 325 mg (65 mg iron) tablet Take 325 mg by mouth daily. 8/24/14  Yes Historical Provider  
docusate sodium (COLACE) 50 mg capsule Take 50 mg by mouth as needed. Yes Historical Provider  
senna-docusate (PERICOLACE) 8.6-50 mg per tablet Take 3 Tabs by mouth as needed. Yes Historical Provider  
cetirizine (ZYRTEC) 10 mg tablet Take 10 mg by mouth nightly. Yes Historical Provider  
acetaminophen (TYLENOL) 325 mg tablet Take 650 mg by mouth as needed. Yes Historical Provider  
aspirin delayed-release 81 mg tablet Take 81 mg by mouth daily. Yes Historical Provider VIT A/VIT C/VIT E/ZINC/COPPER (ICAPS AREDS PO) Take 1 Cap by mouth two (2) times a day. Yes Historical Provider Blood-Glucose Meter (FREESTYLE LITE METER) monitoring kit As directed 
250.00 2/25/13  Yes Bouchra Dee MD  
multivitamins-minerals-lutein (CENTRUM SILVER) Tab Take 1 Tab by mouth daily. Yes Historical Provider  
pantoprazole (PROTONIX) 40 mg tablet Take 1 Tab by mouth daily for 90 days. 8/9/18 11/7/18  Bouchra Dee MD  
 
 
Social History Social History  Marital status:  Spouse name: N/A  
 Number of children: N/A  
 Years of education: N/A Occupational History  Not on file. Social History Main Topics  Smoking status: Former Smoker Packs/day: 1.50 Years: 17.00 Quit date: 1/1/1969  Smokeless tobacco: Never Used  Alcohol use No  
 Drug use: No  
 Sexual activity: Not Currently Other Topics Concern  Not on file Social History Narrative ROS Per HPI Visit Vitals  /60  Pulse 88  Temp 98.5 °F (36.9 °C) (Oral)  Ht 6' 1\" (1.854 m)  Wt 238 lb (108 kg)  SpO2 95% Comment: 2 L  
 BMI 31.4 kg/m2 Physical Exam  
Physical Examination: General appearance - alert, well appearing, and in no distress Mouth - mucous membranes moist, pharynx normal without lesions Neck - supple, no significant adenopathy Chest -scattered rhonchi in both bases. No wheeze. No rales. Heart - normal rate and regular rhythm Abdomen - soft, nontender, nondistended, no masses or organomegaly Neurological - alert, oriented, normal speech, no focal findings or movement disorder noted Extremities - peripheral pulses normal, no pedal edema, no clubbing or cyanosis Assessment/Plan: 
Diagnoses and all orders for this visit: 
 
1. Type 2 diabetes with nephropathy (HCC) -sugars have been about 170 after dinner. Will check labs to be sure this is stable.  
-     HEMOGLOBIN A1C WITH EAG 
-     TSH RFX ON ABNORMAL TO FREE T4 
-     UA/M W/RFLX CULTURE, ROUTINE 
 
 2. CRI (chronic renal insufficiency), stage 3 (moderate) -labs to be sure it stable after his recent procedure. 3. Ischemic cardiomyopathy -#with exacerbation. At this point will check labs and if BNP is elevated refer back to cardiology. 
-     NT-PRO BNP 4. Coronary artery disease involving native coronary artery of native heart without angina pectoris -     METABOLIC PANEL, COMPREHENSIVE 
-     CBC WITH AUTOMATED DIFF 
-     TSH RFX ON ABNORMAL TO FREE T4 
 
5. Panlobular emphysema (HCC) -compliant with meds. We will have him uses oxygen regularly and with any exertion. Today in the office he did maintain an adequate oxygenation with his oxygen on. Follow-up Disposition: 6 months and as needed. Advised him to call back or return to office if symptoms worsen/change/persist. 
Discussed expected course/resolution/complications of diagnosis in detail with patient. Medication risks/benefits/costs/interactions/alternatives discussed with patient. He was given an after visit summary which includes diagnoses, current medications, & vitals. He expressed understanding with the diagnosis and plan.

## 2018-10-01 NOTE — TELEPHONE ENCOUNTER
Spoke with pt daughter Ottie Frankel who is on hipaa. 2 pt identifiers. Urine cx grew >100,000 colonies of staph epidermis. Per [facility], will treat with Bactrim DS BID x 7 days and repeat ua/cx when finished abx. Rx sent to pharm. Lab slip prepared and at . Orders Placed This Encounter    UA/M W/RFLX CULTURE, ROUTINE    trimethoprim-sulfamethoxazole (BACTRIM DS, SEPTRA DS) 160-800 mg per tablet     Sig: Take 1 Tab by mouth two (2) times a day for 7 days.      Dispense:  14 Tab     Refill:  0

## 2018-10-01 NOTE — TELEPHONE ENCOUNTER
----- Message from Unruly Vasquez MD sent at 9/30/2018  8:09 PM EDT -----  Notify UTI - Bactrim DS bid for 7 days. Repeat urine after treatment.

## 2018-10-25 NOTE — PROGRESS NOTES
Hospital Discharge Follow-Up Date/Time:  10/25/2018 1:11 PM 
 
Patient was admitted to [facility] on 10/16/18 and discharged on 10/24/18 for LLE ischemia s/p stent placement in August. The physician discharge summary was available at the time of outreach. Patient's was contacted within 1 business days of discharge. Top Challenges reviewed with the provider  
- [facility] records given to Team I for PCP review - Daughter reports that pt is still having occasional BRB with coughing, not constant and not every time he coughs. - [address]  visit for 10/26/18 
 
- Discharge creatinine 1.33, which is his baseline - Discharge hgb 8.0, baseline 12-13 Method of communication with provider :chart routing Was this a readmission? no  
 
Nurse Navigator (NN) contacted the family by telephone to perform post hospital discharge assessment. Verified name and  with family as identifiers. Provided introduction to self, and explanation of the Nurse Navigator role. Reviewed discharge instructions and red flags with family who verbalized understanding. Family given an opportunity to ask questions and does not have any further questions or concerns at this time. The family agrees to contact the PCP office for questions related to their healthcare. NN provided contact information for future reference. Disease Specific:   N/A Summary of patient's top problems: 1. LLE ischemia- pt presented to [facility] with thombosed left politeal stent. He was at the [address] [place] [address]. visiting his wife's grave and was kneeling for ~30-40 minutes. [facility] EM[facility]GENCY [facility] gave him TPA but this was complicated by upper resp bleeding that required intubation. He was taken to OR next day for thrombectomy. Pulmonary was consulted for his COPD and resp needs. He developed acute on chronic MAYKEL and Nephrology consulted. At discharge was back to 2.5-3 liters oxygen which was his previous baseline.  F/u with  Lo Dugan in 2 weeks in vascular clinic. Home Health orders at discharge: patient refused- daughter states that he remains weak, hoping that PCP may convince him of [place] needst n Durable Medical Equipment ordered/company: none Barriers to care? ineffective coping, stages of grief (due to loss of his wife) Advance Care Planning:  
Does patient have an Advance Directive:  does not have an ACP and has expressed most recently after last intubation, that he does not wish to sign anything because \"That would be causing murder\". He was upset that he was intubated recently and told his family he would not want that again, but still does not want to put it in writing. He has 6 children that fortunately were all in agreement during last admission. Medication(s):  
New Medications at Discharge: none Changed Medications at Discharge: none Discontinued Medications at Discharge: none Medication reconciliation was performed with daughter, Elise Olvera, who verbalizes understanding of administration of home medications. There were no barriers to obtaining medications identified at this time. Referral to Pharm D needed: no  
 
Current Outpatient Medications Medication Sig  pantoprazole (PROTONIX) 40 mg tablet TAKE 1 TABLET BY MOUTH TWICE DAILY  carvedilol (COREG) 6.25 mg tablet TAKE 1 TABLET BY MOUTH TWICE DAILY WITH MEALS  clopidogrel (PLAVIX) 75 mg tab Take  by mouth.  furosemide (LASIX) 80 mg tablet TAKE 1 TABLET BY MOUTH EVERY DAY  atorvastatin (LIPITOR) 20 mg tablet TAKE 1 TABLET BY MOUTH EVERY DAY  SITagliptin (JANUVIA) 100 mg tablet Take 1 Tab by mouth daily. Take 1/2 tablet (50 mg) daily.  pantoprazole (PROTONIX) 40 mg tablet Take 1 Tab by mouth daily for 90 days.  ANORO ELLIPTA 62.5-25 mcg/actuation inhaler INHALE 1 PUFF BY MOUTH EVERY DAY  FREESTYLE LANCETS 28 gauge misc USE AS DIRECTED DAILY BEFORE BREAKFAST.  FREESTYLE LITE STRIPS strip USE TO TEST BLOOD SUGAR DAILY BEFORE BREAKFAST.  folic acid (FOLVITE) 1 mg tablet Take  by mouth daily.  cyanocobalamin 1,000 mcg tablet Take 1,000 mcg by mouth daily.  SENNOSIDES (NATURAL SENNA LAXATIVE PO) Take  by mouth.  albuterol-ipratropium (DUO-NEB) 2.5 mg-0.5 mg/3 ml nebu 3 mL by Nebulization route every four (4) hours as needed.  Lancets misc by Does Not Apply route Daily (before breakfast). DX: E11.65  
 OXYGEN-AIR DELIVERY SYSTEMS 2 L by Does Not Apply route.  albuterol (PROVENTIL HFA, VENTOLIN HFA, PROAIR HFA) 90 mcg/actuation inhaler Take 1 Puff by inhalation every four (4) hours as needed for Wheezing or Shortness of Breath.  ferrous sulfate (IRON) 325 mg (65 mg iron) tablet Take 325 mg by mouth daily.  docusate sodium (COLACE) 50 mg capsule Take 50 mg by mouth as needed.  senna-docusate (PERICOLACE) 8.6-50 mg per tablet Take 3 Tabs by mouth as needed.  cetirizine (ZYRTEC) 10 mg tablet Take 10 mg by mouth nightly.  acetaminophen (TYLENOL) 325 mg tablet Take 650 mg by mouth as needed.  aspirin delayed-release 81 mg tablet Take 81 mg by mouth daily.  VIT A/VIT C/VIT E/ZINC/COPPER (ICAPS AREDS PO) Take 1 Cap by mouth two (2) times a day.  Blood-Glucose Meter (FREESTYLE LITE METER) monitoring kit As directed 
250.00  
 multivitamins-minerals-lutein (CENTRUM SILVER) Tab Take 1 Tab by mouth daily. No current facility-administered medications for this visit. There are no discontinued medications. [facility] follow up appointment(s):  
Future Appointments Date Time Provider Pat Marisela 10/29/2018  2:00 PM Giuseppe Young MD [id number] [facility] follow up appointment(s): Needs to schedule with vascular surgeon, Dr. Samia Knapp in 2 weeks. [facility]:  scheduled post acute visit for 10/26/18 Goals- see care planning

## 2018-10-29 NOTE — PROGRESS NOTES
HPI: 
Valdemar Villalobos is a 80y.o. year old male who is here for a transitional care visit for admit 10/16-10/24 for left leg ischemia. Complicated by respiratory failure and intubation. Was discharged last Wednesday to home. Has been getting stronger until yesterday when he noted gross hematuria. Today the blood is better but still present. No dysuria. No fever or chills. Some BRITTON and no PND or orthopnea. No chest pain. No change in bowels or bladder otherwise. His leg is better and his incision is better. Has a followup appt next week with surgery. His HGB was low and received 3 units of blood in the hospital. HGB was 8 at discharge. Past Medical History:  
Diagnosis Date  Arrhythmia   
 pacemaker  CAD (coronary artery disease)  Diabetes ([address]  Heart disease  History of duodenal ulcer 2014  Hypertension  TRACI on CPAP   
 AHI: 24 per hour  Unspecified sleep apnea CPAP  Vertigo Past Surgical History:  
Procedure Laterality Date  CARDIAC SURG PROCEDURE UNLIST  2007  
 valve replacement, one bypass, AAA repair  CARDIAC SURG PROCEDURE UNLIST  2014  HX HEENT    
 uvulectomy  HX HEENT    
 teeth  HX HEENT    
 cateract  HX ORTHOPAEDIC    
 rotator cuff - rt  HX PACEMAKER Prior to Admission medications Medication Sig Start Date End Date Taking? Authorizing Provider  
pantoprazole (PROTONIX) 40 mg tablet TAKE 1 TABLET BY MOUTH TWICE DAILY 10/7/18  Yes Justyna Browning III, MD  
carvedilol (COREG) 6.25 mg tablet TAKE 1 TABLET BY MOUTH TWICE DAILY WITH MEALS 10/2/18  Yes Justyna Browning III, MD  
clopidogrel (PLAVIX) 75 mg tab Take  by mouth. Yes Provider, Historical  
furosemide (LASIX) 80 mg tablet TAKE 1 TABLET BY MOUTH EVERY DAY 9/3/18  Yes Justyna Browning III, MD  
atorvastatin (LIPITOR) 20 mg tablet TAKE 1 TABLET BY MOUTH EVERY DAY 8/28/18  Yes Sofia Tiwari MD  
SITagliptin (JANUVIA) 100 mg tablet Take 1 Tab by mouth daily.  Take 1/2 tablet (50 mg) daily. 8/23/18  Yes Meseret Matias MD  
[facility] ELLIPTA 62.5-25 mcg/actuation inhaler INHALE 1 PUFF BY MOUTH EVERY DAY 7/2/18  Yes MD VIOLET Dumont IIIYLE LANCETS 28 gauge misc USE AS DIRECTED DAILY BEFORE BREAKFAST. 4/27/18  Yes MD MILA Luz LITE STRIPS strip USE TO TEST BLOOD SUGAR DAILY BEFORE BREAKFAST. 3/5/18  Yes Meseret Matias MD  
folic acid (FOLVITE) 1 mg tablet Take  by mouth daily. Yes Provider, Historical  
cyanocobalamin 1,000 mcg tablet Take 1,000 mcg by mouth daily. Yes Provider, Historical  
SENNOSIDES (NATURAL SENNA LAXATIVE PO) Take  by mouth. Yes Provider, Historical  
albuterol-ipratropium (DUO-NEB) 2.5 mg-0.5 mg/3 ml nebu 3 mL by Nebulization route every four (4) hours as needed. Yes Provider, Historical  
Lancets misc by Does Not Apply route Daily (before breakfast). DX: E11.65 2/22/17  Yes Meseret Matias MD  
OXYGEN-AIR DELIVERY SYSTEMS 3 L by Does Not Apply route. Yes Provider, Historical  
albuterol (PROVENTIL HFA, VENTOLIN HFA, PROAIR HFA) 90 mcg/actuation inhaler Take 1 Puff by inhalation every four (4) hours as needed for Wheezing or Shortness of Breath. 2/27/15  Yes Jony Hartley MD  
ferrous sulfate (IRON) 325 mg (65 mg iron) tablet Take 325 mg by mouth daily. 8/24/14  Yes Provider, Historical  
docusate sodium (COLACE) 50 mg capsule Take 50 mg by mouth as needed. Yes Provider, Historical  
senna-docusate (PERICOLACE) 8.6-50 mg per tablet Take 3 Tabs by mouth as needed. Yes Provider, Historical  
cetirizine (ZYRTEC) 10 mg tablet Take 10 mg by mouth nightly. Yes Provider, Historical  
acetaminophen (TYLENOL) 325 mg tablet Take 650 mg by mouth as needed. Yes Provider, Historical  
aspirin delayed-release 81 mg tablet Take 81 mg by mouth daily. Yes Provider, Historical  
VIT A/VIT C/VIT E/ZINC/COPPER (ICAPS AREDS PO) Take 1 Cap by mouth two (2) times a day.    Yes Provider, Historical  
 Blood-Glucose Meter (FREESTYLE LITE METER) monitoring kit As directed 
250.00 13  Yes Meseret Matias MD  
multivitamins-minerals-lutein (CENTRUM SILVER) Tab Take 1 Tab by mouth daily. Yes Provider, Historical  
pantoprazole (PROTONIX) 40 mg tablet Take 1 Tab by mouth daily for 90 days. 18  Meseret Matias MD  
 
 
Social History Socioeconomic History  Marital status:  Spouse name: Not on file  Number of children: Not on file  Years of education: Not on file  Highest education level: Not on file Social Needs  Financial resource strain: Not on file  Food insecurity - worry: Not on file  Food insecurity - inability: Not on file  Transportation needs - medical: Not on file  Transportation needs - non-medical: Not on file Occupational History  Not on file Tobacco Use  Smoking status: Former Smoker Packs/day: 1.50 Years: 17.00 Pack years: 25.50 Last attempt to quit: 1969 Years since quittin.8  Smokeless tobacco: Never Used Substance and Sexual Activity  Alcohol use: No  
  Alcohol/week: 0.0 oz  Drug use: No  
 Sexual activity: Not Currently Other Topics Concern  Not on file Social History Narrative  Not on file ROS Per HPI Visit Vitals BP 90/53 Pulse (!) 131 Temp 97.9 °F (36.6 °C) Resp (!) 34 Ht 6' 1\" (1.854 m) Wt 236 lb (107 kg) SpO2 91% Comment: 3 L BMI 31.14 kg/m² Physical Exam  
Physical Examination: General appearance - alert, well appearing, and in no distress Chest - clear to auscultation, no wheezes, rales or rhonchi, symmetric air entry Heart - normal rate and regular rhythm Abdomen - soft, nontender, nondistended, no masses or organomegaly Some bruising on the right lower abd not tender. Extremities - peripheral pulses normal, no pedal edema, no clubbing or cyanosis Assessment/Plan: 
Diagnoses and all orders for this visit: 
 
 1. Hematuria, unspecified type - ? Related to blood thinners. Will check labs for infection and consider followup. -     METABOLIC PANEL, BASIC 
-     URINALYSIS W/ RFLX MICROSCOPIC 
-     CULTURE, URINE 2. MAYKEL (acute kidney injury) ([facility] - CR at baseline on discharge. 3. PVD (peripheral vascular disease) ([facility] - good pulses. WIll followup with surgery. He has refused PT and again I asked him to reconsider to work on strengthening. 4. Postoperative anemia due to acute blood loss - repeat labs and see if HGB stable. -     CBC WITH AUTOMATED DIFF Follow-up Disposition: 2 weeks and as needed. Advised him to call back or return to office if symptoms worsen/change/persist. 
Discussed expected course/resolution/complications of diagnosis in detail with patient. Medication risks/benefits/costs/interactions/alternatives discussed with patient. He was given an after visit summary which includes diagnoses, current medications, & vitals. He expressed understanding with the diagnosis and plan.

## 2018-10-29 NOTE — PROGRESS NOTES
- [facility] records given to Team I for PCP review 
  
- Daughter reports that pt is still having occasional BRB with coughing, not constant and not every time he coughs. 
  
- [facility] visit for 10/26/18 
  
- Discharge creatinine 1.33, which is his baseline 
  
- Discharge hgb 8.0, baseline 12-13 Summary of patient's top problems: 1. LLE ischemia- pt presented to [facility] with thombosed left politeal stent. He was at the [address] [place] [address]. visiting his wife's grave and was kneeling for ~30-40 minutes. [facility] gave him TPA but this was complicated by upper resp bleeding that required intubation. He was taken to OR next day for thrombectomy. Pulmonary was consulted for his COPD and resp needs. He developed acute on chronic MAYKEL and Nephrology consulted. At discharge was back to 2.5-3 liters oxygen which was his previous baseline. F/u with Dr. Brxaton Reid in 2 weeks in vascular clinic.  
[facility] orders at discharge: patient refused- daughter states that he remains weak, hoping that PCP may convince him of [facility] needst n Advance Care Planning:  
Does patient have an Advance Directive:  does not have an ACP and has expressed most recently after last intubation, that he does not wish to sign anything because \"That would be causing murder\". He was upset that he was intubated recently and told his family he would not want that again, but still does not want to put it in writing. He has 6 children that fortunately were all in agreement during last admission. Medication(s):  
New Medications at [address] [place] [address] Changed Medications at [address] [place] [address] Discontinued Medications at [address] [place] [address] 
Medication reconciliation was performed with daughter, Kelly Ramon, who verbalizes understanding of administration of home medications. There were no barriers to obtaining medications identified at this time.

## 2018-10-31 NOTE — TELEPHONE ENCOUNTER
Fax# [phone]      Requesting most recent labwork --this is for a regular f/u on 11/9 -- nothing to do with the blood in his urine or clots -- still needs to see urology.

## 2018-11-06 NOTE — PROGRESS NOTES
Follow up call to pt's daughter, Skylar Breaux. Reviewed that pt attended Urology appt with Dr. Deepthi Blair. Dr. Deepthi Blair recommended an [address] and Cysto to be done on 11/8/18. Pt told Skylar Breaux that he is likely to not attend because his symptoms are now gone. She is trying to encourage him to go. He is scheduled for f/u with [facility] on 11/13/18- is going for repeat CXR tomorrow. Pt is putting up a fuss about going for the xray as well. He continues to refuse HH, but also states he is too weak to walk short distances in home. He is wearing his oxygen and wearing his night time CPAP. States that he is getting more SOB with minimal activity. Offered appt for pt to follow up with Dr. Андрей Olvera tomorrow, but Shefali Clark does not think he will agree. She took at Thursday appt instead, but knows to call first thing in morning for acute visit if he has a bad night. Reviewed that Shefali Clark has the [facility] info in the event pt's SOB is not severe and he wants to be seen in the home instead. Red flags reviewed. Future Appointments Date Time Provider Pat Antonio 11/8/2018  1:00 PM Jermaine Greco MD [address] [facility]

## 2018-11-08 NOTE — PROGRESS NOTES
HPI:  Divine Garcia is a 80y.o. year old male who is here for a routine visit:    Here for follow-up visit after recent hospitalization for COPD exacerbation and left leg stent restenosis. He was seen here last week and had some gross hematuria. He was referred to urology and they suggested a cystoscopy and ultrasound. He has refused those. He says that his urine has completely returned to normal.  He denies any dysuria hematuria. Denies nausea or vomiting. His appetite is improving. He just had a chest x-ray done today for pulmonology and will see the pulmonologist in follow-up in the next week. He also has a follow-up with nephrology tomorrow. He feels that his breathing is good. He does have some difficulty with shortness of breath when he removes his oxygen. His debilitation continues to be an issue and he is only able to ambulate short distances without shortness of breath. Past Medical History:   Diagnosis Date    Arrhythmia     pacemaker    CAD (coronary artery disease)     Diabetes ([facility]     Heart disease     History of duodenal ulcer 2014    Hypertension     TRACI on CPAP     AHI: 24 per hour    Unspecified sleep apnea     CPAP    Vertigo        Past Surgical History:   Procedure Laterality Date    CARDIAC SURG PROCEDURE UNLIST  2007    valve replacement, one bypass, AAA repair    CARDIAC SURG PROCEDURE UNLIST  2014    HX HEENT      uvulectomy    HX HEENT      teeth    HX HEENT      cateract    HX ORTHOPAEDIC      rotator cuff - rt    HX PACEMAKER         Prior to Admission medications    Medication Sig Start Date End Date Taking? Authorizing Provider   pantoprazole (PROTONIX) 40 mg tablet TAKE 1 TABLET BY MOUTH TWICE DAILY 11/5/18  Yes Noah Johnson MD   carvedilol (COREG) 6.25 mg tablet TAKE 1 TABLET BY MOUTH TWICE DAILY WITH MEALS 10/2/18  Yes Mckenna Parsons III, MD   clopidogrel (PLAVIX) 75 mg tab Take  by mouth.    Yes Provider, Historical   furosemide (LASIX) 80 mg tablet TAKE 1 TABLET BY MOUTH EVERY DAY 9/3/18  Yes Jacinto Dotson III, MD   atorvastatin (LIPITOR) 20 mg tablet TAKE 1 TABLET BY MOUTH EVERY DAY 8/28/18  Yes Jacinto Dotson III, MD   SITagliptin (JANUVIA) 100 mg tablet Take 1 Tab by mouth daily. Take 1/2 tablet (50 mg) daily. 8/23/18  Yes Giuseppe Young MD   [facility] ELLIPTA 62.5-25 mcg/actuation inhaler INHALE 1 PUFF BY MOUTH EVERY DAY 7/2/18  Yes MD QUOC Garcia IIISTYLE LANCETS 28 gauge misc USE AS DIRECTED DAILY BEFORE BREAKFAST. 4/27/18  Yes MD MILA Bates LITE STRIPS strip USE TO TEST BLOOD SUGAR DAILY BEFORE BREAKFAST. 3/5/18  Yes Giuseppe Young MD   folic acid (FOLVITE) 1 mg tablet Take  by mouth daily. Yes Provider, Historical   cyanocobalamin 1,000 mcg tablet Take 1,000 mcg by mouth daily. Yes Provider, Historical   SENNOSIDES (NATURAL SENNA LAXATIVE PO) Take  by mouth. Yes Provider, Historical   albuterol-ipratropium (DUO-NEB) 2.5 mg-0.5 mg/3 ml nebu 3 mL by Nebulization route every four (4) hours as needed. Yes Provider, Historical   Lancets misc by Does Not Apply route Daily (before breakfast). DX: E11.65 2/22/17  Yes Giuseppe Young MD   OXYGEN-AIR DELIVERY SYSTEMS 3 L by Does Not Apply route. Yes Provider, Historical   albuterol (PROVENTIL HFA, VENTOLIN HFA, PROAIR HFA) 90 mcg/actuation inhaler Take 1 Puff by inhalation every four (4) hours as needed for Wheezing or Shortness of Breath. 2/27/15  Yes Renetta Fisher MD   ferrous sulfate (IRON) 325 mg (65 mg iron) tablet Take 325 mg by mouth daily. 8/24/14  Yes Provider, Historical   docusate sodium (COLACE) 50 mg capsule Take 50 mg by mouth as needed. Yes Provider, Historical   senna-docusate (PERICOLACE) 8.6-50 mg per tablet Take 3 Tabs by mouth as needed. Yes Provider, Historical   cetirizine (ZYRTEC) 10 mg tablet Take 10 mg by mouth nightly.    Yes Provider, Historical   acetaminophen (TYLENOL) 325 mg tablet Take 650 mg by mouth as needed. Yes Provider, Historical   aspirin delayed-release 81 mg tablet Take 81 mg by mouth daily. Yes Provider, Historical   VIT A/VIT C/VIT E/ZINC/COPPER (ICAPS AREDS PO) Take 1 Cap by mouth two (2) times a day. Yes Provider, Historical   Blood-Glucose Meter (FREESTYLE LITE METER) monitoring kit As directed  250.00 13  Yes Rafael Eckert MD   multivitamins-minerals-lutein (CENTRUM SILVER) Tab Take 1 Tab by mouth daily. Yes Provider, Historical   pantoprazole (PROTONIX) 40 mg tablet Take 1 Tab by mouth daily for 90 days.  18  Rafael Eckert MD       Social History     Socioeconomic History    Marital status:      Spouse name: Not on file    Number of children: Not on file    Years of education: Not on file    Highest education level: Not on file   Social Needs    Financial resource strain: Not on file    Food insecurity - worry: Not on file    Food insecurity - inability: Not on file    Transportation needs - medical: Not on file   [employer] needs - non-medical: Not on file   Occupational History    Not on file   Tobacco Use    Smoking status: Former Smoker     Packs/day: 1.50     Years: 17.00     Pack years: 25.50     Last attempt to quit: 1969     Years since quittin.8    Smokeless tobacco: Never Used   Substance and Sexual Activity    Alcohol use: No     Alcohol/week: 0.0 oz    Drug use: No    Sexual activity: Not Currently   Other Topics Concern    Not on file   Social History Narrative    Not on file          ROS  Per HPI    Visit Vitals  /62   Pulse 72   Temp 97.4 °F (36.3 °C) (Oral)   Resp 14   Ht 6' 1\" (1.854 m)   Wt 231 lb (104.8 kg)   SpO2 92% Comment: 3 L   BMI 30.48 kg/m²         Physical Exam   Physical Examination: General appearance - alert, well appearing, and in no distress  Ears - bilateral TM's and external ear canals normal, left external canal inflamed, ceruminosis noted, cerumen removed  Chest - clear to auscultation, no wheezes, rales or rhonchi, symmetric air entry  Heart - normal rate and regular rhythm  Extremities - peripheral pulses normal, no pedal edema, no clubbing or cyanosis      Assessment/Plan:  Diagnoses and all orders for this visit:    1. COPD exacerbation ([address] -appears to be slowly improving. Agree with plans to see pulmonary for follow-up. He will continue to use his oxygen and other inhalers for now. Suggested physical therapy to help with generalized weakness and he will consider.-     REFERRAL TO PHYSICAL THERAPY     2. Weakness generalized  -     REFERRAL TO PHYSICAL THERAPY  3.  Hematuria-resolved. Encouraged him to have a workup but he refuses at this point. 4.  Congestive heart failure-stable  5. Earwax-resolved. He does have some issues with persistent hearing loss. Follow-up Disposition: as needed      Advised him to call back or return to office if symptoms worsen/change/persist.  Discussed expected course/resolution/complications of diagnosis in detail with patient. Medication risks/benefits/costs/interactions/alternatives discussed with patient. He was given an after visit summary which includes diagnoses, current medications, & vitals. He expressed understanding with the diagnosis and plan.

## 2018-12-31 NOTE — TELEPHONE ENCOUNTER
Orders Placed This Encounter    pantoprazole (PROTONIX) 40 mg tablet     Sig: TAKE 1 TABLET BY MOUTH TWICE DAILY     Dispense:  180 Tab     Refill:  1     The above orders were approved via [facility] per Dr. He Cuadra, III.

## 2018-12-31 NOTE — TELEPHONE ENCOUNTER
Orders Placed This Encounter    carvedilol (COREG) 6.25 mg tablet     Sig: TAKE 1 TABLET BY MOUTH TWICE DAILY WITH MEALS     Dispense:  180 Tab     Refill:  0     The above orders were approved via [facility] per Dr. Titi Bocanegra, III.

## 2019-01-01 ENCOUNTER — TELEPHONE (OUTPATIENT)
Dept: ONCOLOGY | Age: 84
End: 2019-01-01

## 2019-01-01 ENCOUNTER — APPOINTMENT (OUTPATIENT)
Dept: PHYSICAL THERAPY | Age: 84
End: 2019-01-01

## 2019-01-01 ENCOUNTER — OFFICE VISIT (OUTPATIENT)
Dept: ONCOLOGY | Age: 84
End: 2019-01-01

## 2019-01-01 ENCOUNTER — DOCUMENTATION ONLY (OUTPATIENT)
Dept: ONCOLOGY | Age: 84
End: 2019-01-01

## 2019-01-01 ENCOUNTER — HOSPITAL ENCOUNTER (OUTPATIENT)
Dept: CT IMAGING | Age: 84
Discharge: HOME | End: 2019-02-11
Payer: MEDICARE

## 2019-01-01 VITALS
DIASTOLIC BLOOD PRESSURE: 54 MMHG | OXYGEN SATURATION: 90 % | HEIGHT: 73 IN | SYSTOLIC BLOOD PRESSURE: 103 MMHG | WEIGHT: 220 LBS | TEMPERATURE: 98 F | HEART RATE: 67 BPM | RESPIRATION RATE: 16 BRPM | BODY MASS INDEX: 29.16 KG/M2

## 2019-01-01 DIAGNOSIS — N18.30 CRI (CHRONIC RENAL INSUFFICIENCY), STAGE 3 (MODERATE) (HCC): ICD-10-CM

## 2019-01-01 DIAGNOSIS — C67.9 MALIGNANT NEOPLASM OF URINARY BLADDER, UNSPECIFIED SITE (HCC): Primary | ICD-10-CM

## 2019-01-01 DIAGNOSIS — E11.21 TYPE 2 DIABETES WITH NEPHROPATHY (HCC): ICD-10-CM

## 2019-01-01 DIAGNOSIS — I25.10 CORONARY ARTERY DISEASE INVOLVING NATIVE CORONARY ARTERY OF NATIVE HEART WITHOUT ANGINA PECTORIS: ICD-10-CM

## 2019-01-01 DIAGNOSIS — C67.9 MALIGNANT NEOPLASM OF URINARY BLADDER, UNSPECIFIED SITE (HCC): ICD-10-CM

## 2019-01-01 LAB — CREATININE, EXTERNAL: 2.06

## 2019-01-01 PROCEDURE — 71250 CT THORAX DX C-: CPT

## 2019-01-01 RX ORDER — ATORVASTATIN CALCIUM 20 MG/1
TABLET, FILM COATED ORAL
Qty: 90 TAB | Refills: 0 | Status: SHIPPED | OUTPATIENT
Start: 2019-01-01

## 2019-01-01 RX ORDER — FINASTERIDE 5 MG/1
TABLET, FILM COATED ORAL
Refills: 1 | COMMUNITY
Start: 2019-01-01

## 2019-02-07 PROBLEM — C67.9 MALIGNANT NEOPLASM OF URINARY BLADDER (HCC): Status: ACTIVE | Noted: 2019-01-01

## 2019-02-07 NOTE — PROGRESS NOTES
Shell Scherer is a 80 y.o. male here today as a new patient, bladder cancer. Patient arrives today with daughter, Aniceto Jasso,  on 3L of 02 continuously. Has wynn catheter in place.

## 2019-02-07 NOTE — PROGRESS NOTES
This note will not be viewable in [address] Oncology Navigator Psychosocial AssessmentReason for Assessment:   
[]Depression  []Anxiety  []Caregiver Wingina  []Maladaptive Coping with Serious Illness   [x]Other: Initial assessment Sources of Information:   
[x]Patient  []Family  []Staff  []Medical Record Advance Care Planning: No flowsheet data found. Patient does not have an AMD and does not wish to complete an AMD at this time. Mental Status:   
[x]Alert  []Lethargic  []Unresponsive Oriented to:  [x]Person  [x]Place  [x]Time  [x]Situation Barriers to Learning:   
[]Language  []Developmental  []Cognitive  []Altered Mental Status  []Visual/Hearing Impairment  []Unable to Read/Write  []Motivational   [x]No Barriers Identified  []Other: 
 
Relationship Status: 
[]Single  []  []Significant Other/Life Partner  []  []  [x] Living Circumstances: 
[]Lives Alone  [x]Family/Significant Other in Household  []Roommates  []Children in the Home  []Paid Caregivers  []Assisted Living Facility/Group Home  []Skilled [address]  []Homeless  []Incarcerated  []Environmental/Care Concerns  []Other: 
 
Support System:   
[x]Strong  []Fair  []Limited Financial/Legal Concerns:   
[]Uninsured  []Limited Income/Resources  []Non-Citizen  [x]No Concerns Identified  []Financial POA:   
[]Other: 
 
Moravian/Spiritual/Existential: 
[]Strong Sense of Spirituality  []Involved in [facility] []Request  Visit  []Expressing Spiritual/Existential Angst  [x]No Concerns Identified Coping with Illness:       
 Patient: Family/Caregiver:  
Understanding and Acceptance of Illness/Prognosis  [x] [x] Strong Sense of Resilience  [x] [x] Self Reflection [x] [x] Engaged Support System [x] [x] Does not Readily Discuss Illness [] [] Denial of Terminal Status [] [] Anger [] [] Depression [] [] Anxiety/Fear [] []  
Bargaining [] [] Recent Diagnosis/Prognosis [] [] Difficulties with Body Image [] [] Loss of Identity [] [] Excessive Substance Use [] [] Mental Health History [] []  
Enmeshed Relationships [] [] History of Loss [] [] Anticipatory Grief [] [] Concern for Complicated Grief [] [] Suicidal Ideation or Plan [] [] Unable to assess [] []  
          
Narrative: Met with the patient and his daughter, Eran Davis, during his initial visit today. The patient insisted on keeping the exam room door open during his office visit today, and asked \"Why would I have to talk with a ? \" The patient lives with his daughter Dionna Abebe, and son-in-law. The patient and his wife moved in with [address] [facility] a few years ago. The patient built a ramp on the home at that time. His wife passed away in 2012. As of late, the patient has needed a wynn catheter, and has been using his son-in-[facility] Patient also has oxygen through [address], and a CPAP managed by [facility]. The patient has a PCP - Dr. Hima Augustin. He is followed by Dr. Earnesteen Hashimoto, Urologist.  The patient is retired. 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 His daughter is employed as an RN, and handles his care. The patient and his daughter declined the idea of home health nursing, but are open to the idea of ordering a wheelchair for the patient. Dr. Alexandro Hoang RN plans to follow up with the family to offer home health PT in order to assess for a wheelchair. Referrals:   
 
I. Transportation Medicaid (Kelsy Farooq) [] ACS Road to Recovery [] Regional organization  [] Financial Assistance/Medication Access Patient assistance program (Care Card) [] Co-pay assistance  [] Leukemia & Lymphoma Society [] [address]  [] Patient [address] [] CancerCare  [] Emotional support Peer support group [] Local counseling [] Online support group [] Coordination of psychiatry consult [] Goals/Plan:  
1. Introduced self and role of this  in the [facility]. 2.  Informed the patient of the [facility] and available resources there. 3.  Continue to meet with the patient when he returns to the clinic for ongoing assessment of the patients adjustment to his diagnosis and treatment. 4.  Ongoing psychosocial support as desired by patient.    
ANI Snyder

## 2019-02-07 NOTE — PROGRESS NOTES
[facility] at [place] [address], [address]  W: [phone]  F: [phone]    Reason for Visit:   Enrico Campos is a 80 y.o. male who is seen in consultation at the request of Dr. Akhil King for evaluation of Muscle invasive bladder cancer    Treatment History:   · 11/27/18 CT abdomen-this showed a nodular density within the bladder lumen which represented hemorrhage or malignancy. Chronic left-sided hydronephrosis. · 12/6/18-cystoscopy showed a clot and a blood-tinged urine, long prostatic urethra with circumferential bulging of the prostate into the bladder. · 1/14/19 TURBT showed a left posterior wall bladder tumor. The tumor was medially adjacent to and extending into a diverticulum on the left posterior wall, ureteral orifices were not involved. Tumor was resected. It was about 5 cm in size though there still was concern for remnant tumor. Revealed invasive high-grade urothelial carcinoma with scattered squamous cell differentiation    History of Present Illness:   Patient is a 80 y.o. male with multiple medical problems is seen for muscle invasive bladder cancer. He presented with gross hematuria and urinary retention in December 2018. He also has a left pelvic kidney with chronic hydronephrosis and atrophy. Last creatinine elevated at 1.51. , Hb 6.5 g/dl, MCV 99, Plts 105k. He has since undergone TURBT with diagnosis as above. Was recently admitted to [facility] with SOB and hematuria. He has noted gradually declining health in the last 2-3 months but 6 months ago reports being independent     Comes with his daughter who is an RN. Has intermittent hematuria though this is markedly improved since his TURBT and cauterization. He has no energy, is on O2 at rest, he comes in a wheelchair. Notes no pain. Appetite is decreased. Has no CP, not falling, has struggled from constipation.  Lost his wife 3 years ago and expresses unhappiness with his experiences during hospital admissions. Past Medical History:   Diagnosis Date    Arrhythmia     pacemaker    CAD (coronary artery disease)     Diabetes ([address]     Heart disease     History of duodenal ulcer     Hypertension     TRACI on CPAP     AHI: 24 per hour    Unspecified sleep apnea     CPAP    Vertigo       Past Surgical History:   Procedure Laterality Date    CARDIAC SURG PROCEDURE UNLIST      valve replacement, one bypass, AAA repair    CARDIAC SURG PROCEDURE UNLIST      HX HEENT      uvulectomy    HX HEENT      teeth    HX HEENT      cateract    HX ORTHOPAEDIC      rotator cuff - rt    HX PACEMAKER        Social History     Tobacco Use    Smoking status: Former Smoker     Packs/day: 1.50     Years: 17.00     Pack years: 25.50     Last attempt to quit: 1969     Years since quittin.1    Smokeless tobacco: Never Used   Substance Use Topics    Alcohol use: No     Alcohol/week: 0.0 oz      Family History   Problem Relation Age of Onset    Heart Disease Mother      Current Outpatient Medications   Medication Sig    carvedilol (COREG) 6.25 mg tablet TAKE 1 TABLET BY MOUTH TWICE DAILY WITH MEALS    pantoprazole (PROTONIX) 40 mg tablet TAKE 1 TABLET BY MOUTH TWICE DAILY    FREESTYLE LITE STRIPS strip USE TO TEST BLOOD SUGAR DAILY BEFORE BREAKFAST.  FREESTYLE LITE STRIPS strip USE TO TEST BLOOD SUGAR DAILY BEFORE BREAKFAST.  furosemide (LASIX) 80 mg tablet TAKE 1 TABLET BY MOUTH EVERY DAY    atorvastatin (LIPITOR) 20 mg tablet TAKE 1 TABLET BY MOUTH EVERY DAY    clopidogrel (PLAVIX) 75 mg tab Take  by mouth.  SITagliptin (JANUVIA) 100 mg tablet Take 1 Tab by mouth daily. Take 1/2 tablet (50 mg) daily.  ANORO ELLIPTA 62.5-25 mcg/actuation inhaler INHALE 1 PUFF BY MOUTH EVERY DAY    FREESTYLE LANCETS 28 gauge misc USE AS DIRECTED DAILY BEFORE BREAKFAST.  folic acid (FOLVITE) 1 mg tablet Take  by mouth daily.     cyanocobalamin 1,000 mcg tablet Take 1,000 mcg by mouth daily.    SENNOSIDES (NATURAL SENNA LAXATIVE PO) Take  by mouth.  albuterol-ipratropium (DUO-NEB) 2.5 mg-0.5 mg/3 ml nebu 3 mL by Nebulization route every four (4) hours as needed.  Lancets misc by Does Not Apply route Daily (before breakfast). DX: E11.65    OXYGEN-AIR DELIVERY SYSTEMS 3 L by Does Not Apply route.  albuterol (PROVENTIL HFA, VENTOLIN HFA, PROAIR HFA) 90 mcg/actuation inhaler Take 1 Puff by inhalation every four (4) hours as needed for Wheezing or Shortness of Breath.  ferrous sulfate (IRON) 325 mg (65 mg iron) tablet Take 325 mg by mouth daily.  docusate sodium (COLACE) 50 mg capsule Take 50 mg by mouth as needed.  senna-docusate (PERICOLACE) 8.6-50 mg per tablet Take 3 Tabs by mouth as needed.  cetirizine (ZYRTEC) 10 mg tablet Take 10 mg by mouth nightly.  acetaminophen (TYLENOL) 325 mg tablet Take 650 mg by mouth as needed.  aspirin delayed-release 81 mg tablet Take 81 mg by mouth daily.  VIT A/VIT C/VIT E/ZINC/COPPER (ICAPS AREDS PO) Take 1 Cap by mouth two (2) times a day.  Blood-Glucose Meter (FREESTYLE LITE METER) monitoring kit As directed  250.00    multivitamins-minerals-lutein (CENTRUM SILVER) Tab Take 1 Tab by mouth daily.  finasteride (PROSCAR) 5 mg tablet TK 1 T PO D     No current facility-administered medications for this visit. Allergies   Allergen Reactions    Erythromycin Swelling     Goiter swells    Lisinopril Other (comments)     Flushing    Losartan Shortness of Breath        Review of Systems: A complete review of systems was obtained, negative except as described above.     Physical Exam:     Visit Vitals  Ht 6' 1\" (1.854 m)   BMI 30.48 kg/m²     ECOG PS: 3  General: No distress, in a wheelchair, on O2  Eyes: PERRLA, anicteric sclerae, pallor+  HENT: Atraumatic, OP clear  Neck: Supple  Lymphatic: No cervical, supraclavicular, or inguinal adenopathy  Respiratory: CTAB, normal respiratory effort  CV: Normal rate, regular rhythm, no murmurs, no peripheral edema  GI: Soft, nontender, nondistended, no masses, no hepatomegaly, no splenomegaly  MS: No edema, gait not assessed  Skin: No rashes, ecchymoses, or petechiae. Normal temperature, turgor, and texture. Psych: Alert, oriented, appropriate affect, normal judgment/insight    Results:     Lab Results   Component Value Date/Time    WBC 6.8 10/29/2018 02:56 PM    HGB 10.6 (L) 10/29/2018 02:56 PM    HCT 31.2 (L) 10/29/2018 02:56 PM    PLATELET 592 02/32/1369 02:56 PM    MCV 95 10/29/2018 02:56 PM    ABS. NEUTROPHILS 4.5 10/29/2018 02:56 PM     Lab Results   Component Value Date/Time    Sodium 139 10/29/2018 02:56 PM    Potassium 4.8 10/29/2018 02:56 PM    Chloride 97 10/29/2018 02:56 PM    CO2 29 10/29/2018 02:56 PM    Glucose 138 (H) 10/29/2018 02:56 PM    BUN 21 10/29/2018 02:56 PM    Creatinine 1.49 (H) 10/29/2018 02:56 PM    GFR est AA 48 (L) 10/29/2018 02:56 PM    GFR est non-AA 42 (L) 10/29/2018 02:56 PM    Calcium 8.9 10/29/2018 02:56 PM    Glucose (POC) 118 (H) 04/30/2013 11:53 AM     Lab Results   Component Value Date/Time    Bilirubin, total 0.3 09/26/2018 02:24 PM    ALT (SGPT) 8 09/26/2018 02:24 PM    AST (SGOT) 12 09/26/2018 02:24 PM    Alk. phosphatase 66 09/26/2018 02:24 PM    Protein, total 6.5 09/26/2018 02:24 PM    Albumin 3.5 09/26/2018 02:24 PM    Globulin 3.6 04/18/2013 05:45 AM     Outside  Records reviewed and summarized above. Pathology report(s) reviewed above. Radiology report(s) reviewed above. Assessment:   1) Muscle invasive bladder cancer    HG urothelial with scattered squamous differentiation  [id number]  ECOG 3    Reviewed the natural h/o bladder cancer  He is not a candidate for neoadjuvant chemotherapy and cystectomy  I discussed chemoRT which will be palliative intent ( as he would remain ineligible for a salvage cystectomy if he recurred).  He declined this as well as he values his quality of life and does not want to go through treatments that wont bring cure and may add to toxicity    We then discussed palliative RT alone with the goal of disease control for a finite period of time during which we hope he has a desirable quality of life. This will not prevent or delay development of metastatic disease which he remains at risk for. Hence I also reviewed the option of palliative PD-L1 inhibition after palliative RT    He is understandably distraught and \" not sure\" if all this is worth it. Wants to think. Does not want to suffer  I also assured him that if he chooses to do no additional treatments we will support him and ensure he is comfortable with Hospice services    He then stated that if he has metastatic disease then he wants to [place] nature take its course. We will obtain a CT chest to r/o mets to the lung    2) COPD  On O2    3) CKD  Last creatinine was 1.5    4) Anemia  Has received multiple transfusions in the last months  Offered IV iron- wants to hold off    5) Psychosocial  Distraught, supportive family  SW consulted          Plan:     · CT chest WO Contrast  · Follow with  [facility] if you decide to pursue RT, then see us back to review palliative immunotherapy  · If you choose not to pursue additional treatments we will arrange for Hospice    > 50% of this 60 min visit was spent in counseling    [facility] opportunity [facility]     Signed By: Kayleigh Delacruz MD

## 2019-02-08 NOTE — TELEPHONE ENCOUNTER
Ricardo Rocha called on half of patient stating that she would like a call back about having scans done before Tuesday.

## 2019-02-08 NOTE — TELEPHONE ENCOUNTER
Call to patient. HIPAA verified. Scheduling CT has been worked out. CT scan Monday.   Dr Tim Zuniga Tuesday

## 2019-02-11 NOTE — TELEPHONE ENCOUNTER
Patients daughter called and stated that the patient has himself all worked up about his CT and would like to know the results before his appointment tomorrow so the patient can decide on hospice or not. I clarified and told patients daughter that the results have not been read by the doctor and the clinical team would call as Dr. Sukhi Catalan seen results.     # [phone]

## 2019-02-12 NOTE — TELEPHONE ENCOUNTER
Talked to Bentley July and let her know that the CT scans shows small lung nodules    Though we cant prove it this is suspicious for metastatic disease    This is incurable  I still suggest palliative RT to the bladder  Thereafter depending on his wishes will discuss palliative Immunotherapy vs Hospice    Thanked for call

## 2019-02-12 NOTE — PROGRESS NOTES
Please call the patients daughter and let her know that the CT chest results are available.  I would like to discuss with her so suggest she call our office

## 2019-02-15 NOTE — TELEPHONE ENCOUNTER
Trudy Gu from [facility] radiology called nad would like a call back from Matias Reinoso     [phone]

## 2019-02-18 NOTE — TELEPHONE ENCOUNTER
Returned call to radiology, they wanted to follow up with our office to make sure our office had received report from 2/11 CT chest. Advised report had been received. Thanked for call.

## 2021-02-04 NOTE — PROGRESS NOTES
Dear Team Member,    Per your recent telephone screening with Employee Health:    · Your lab appointment was scheduled at [facility] 02/04 1145am  · You are to remain off work and isolate yourself from  public places except to seek medical care  · Complete the symptom tracker daily on Care Companion to comply with your actively monitoring status    YOU MAY NOT RETURN TO WORK WITHOUT CLEARANCE FROM EMPLOYEE HEALTH.  It is not necessary to call Employee Health.  The Central COVID Team will contact you at the appropriate interval for clearance.    Off work start date: 02/04/21        Notify Employee Health if you have a marked increase  in symptoms while you are off.[phone] or email [email or web address].    For [place] Employees, please follow this link to view a copy of the consent form:  [place] Consent Form     For [place] Employees, please follow this link to view a copy of the consent form:   [place] Consent Form     Thank you,    Employee Health Central COVID Team    [phone]    Hours: M-F [phone] Saturday - please answer your phone if an outside line is calling, regardless of hours we work 7days a week and will follow up as appropriate.     [email or web address]    Cc: Manager   Opened in error, EDEN Goldsmith already reached out to pt's daughter.